# Patient Record
Sex: FEMALE | Race: WHITE | Employment: UNEMPLOYED | URBAN - METROPOLITAN AREA
[De-identification: names, ages, dates, MRNs, and addresses within clinical notes are randomized per-mention and may not be internally consistent; named-entity substitution may affect disease eponyms.]

---

## 2022-06-21 ENCOUNTER — HOSPITAL ENCOUNTER (EMERGENCY)
Age: 37
Discharge: LEFT BEFORE DISCHARGE | DRG: 074 | End: 2022-06-21
Attending: STUDENT IN AN ORGANIZED HEALTH CARE EDUCATION/TRAINING PROGRAM
Payer: MEDICARE

## 2022-06-21 VITALS
HEART RATE: 125 BPM | BODY MASS INDEX: 23.22 KG/M2 | OXYGEN SATURATION: 99 % | DIASTOLIC BLOOD PRESSURE: 43 MMHG | TEMPERATURE: 98 F | WEIGHT: 136 LBS | HEIGHT: 64 IN | SYSTOLIC BLOOD PRESSURE: 115 MMHG | RESPIRATION RATE: 20 BRPM

## 2022-06-21 DIAGNOSIS — R11.2 NAUSEA AND VOMITING, UNSPECIFIED VOMITING TYPE: Primary | ICD-10-CM

## 2022-06-21 LAB
BASOPHILS # BLD: 0.1 K/UL (ref 0–0.1)
BASOPHILS NFR BLD: 1 % (ref 0–1)
DIFFERENTIAL METHOD BLD: ABNORMAL
EOSINOPHIL # BLD: 0.2 K/UL (ref 0–0.4)
EOSINOPHIL NFR BLD: 1 % (ref 0–7)
ERYTHROCYTE [DISTWIDTH] IN BLOOD BY AUTOMATED COUNT: 15.2 % (ref 11.5–14.5)
GLUCOSE BLD STRIP.AUTO-MCNC: 157 MG/DL (ref 65–117)
HCT VFR BLD AUTO: 37.7 % (ref 35–47)
HGB BLD-MCNC: 12.4 G/DL (ref 11.5–16)
IMM GRANULOCYTES # BLD AUTO: 0.1 K/UL (ref 0–0.04)
IMM GRANULOCYTES NFR BLD AUTO: 1 % (ref 0–0.5)
LYMPHOCYTES # BLD: 2.3 K/UL (ref 0.8–3.5)
LYMPHOCYTES NFR BLD: 18 % (ref 12–49)
MCH RBC QN AUTO: 30.1 PG (ref 26–34)
MCHC RBC AUTO-ENTMCNC: 32.9 G/DL (ref 30–36.5)
MCV RBC AUTO: 91.5 FL (ref 80–99)
MONOCYTES # BLD: 0.8 K/UL (ref 0–1)
MONOCYTES NFR BLD: 6 % (ref 5–13)
NEUTS SEG # BLD: 9.4 K/UL (ref 1.8–8)
NEUTS SEG NFR BLD: 73 % (ref 32–75)
NRBC # BLD: 0 K/UL (ref 0–0.01)
NRBC BLD-RTO: 0 PER 100 WBC
PLATELET # BLD AUTO: 431 K/UL (ref 150–400)
PMV BLD AUTO: 11.1 FL (ref 8.9–12.9)
RBC # BLD AUTO: 4.12 M/UL (ref 3.8–5.2)
SERVICE CMNT-IMP: ABNORMAL
WBC # BLD AUTO: 12.8 K/UL (ref 3.6–11)

## 2022-06-21 PROCEDURE — 82962 GLUCOSE BLOOD TEST: CPT

## 2022-06-21 PROCEDURE — 99284 EMERGENCY DEPT VISIT MOD MDM: CPT

## 2022-06-21 PROCEDURE — 74011250636 HC RX REV CODE- 250/636: Performed by: STUDENT IN AN ORGANIZED HEALTH CARE EDUCATION/TRAINING PROGRAM

## 2022-06-21 PROCEDURE — 2709999900 HC NON-CHARGEABLE SUPPLY

## 2022-06-21 PROCEDURE — 96375 TX/PRO/DX INJ NEW DRUG ADDON: CPT

## 2022-06-21 PROCEDURE — 74011000250 HC RX REV CODE- 250: Performed by: STUDENT IN AN ORGANIZED HEALTH CARE EDUCATION/TRAINING PROGRAM

## 2022-06-21 PROCEDURE — 96374 THER/PROPH/DIAG INJ IV PUSH: CPT

## 2022-06-21 PROCEDURE — 96361 HYDRATE IV INFUSION ADD-ON: CPT

## 2022-06-21 PROCEDURE — 36415 COLL VENOUS BLD VENIPUNCTURE: CPT

## 2022-06-21 PROCEDURE — 85025 COMPLETE CBC W/AUTO DIFF WBC: CPT

## 2022-06-21 RX ORDER — LORAZEPAM 2 MG/ML
0.5 INJECTION INTRAMUSCULAR
Status: COMPLETED | OUTPATIENT
Start: 2022-06-21 | End: 2022-06-21

## 2022-06-21 RX ORDER — ONDANSETRON 2 MG/ML
4 INJECTION INTRAMUSCULAR; INTRAVENOUS
Status: COMPLETED | OUTPATIENT
Start: 2022-06-21 | End: 2022-06-21

## 2022-06-21 RX ORDER — MORPHINE SULFATE 2 MG/ML
4 INJECTION, SOLUTION INTRAMUSCULAR; INTRAVENOUS ONCE
Status: COMPLETED | OUTPATIENT
Start: 2022-06-21 | End: 2022-06-21

## 2022-06-21 RX ADMIN — SODIUM CHLORIDE 1000 ML: 9 INJECTION, SOLUTION INTRAVENOUS at 21:15

## 2022-06-21 RX ADMIN — ONDANSETRON 4 MG: 2 INJECTION INTRAMUSCULAR; INTRAVENOUS at 21:14

## 2022-06-21 RX ADMIN — LORAZEPAM 0.5 MG: 2 INJECTION INTRAMUSCULAR; INTRAVENOUS at 21:41

## 2022-06-21 RX ADMIN — FAMOTIDINE 20 MG: 10 INJECTION INTRAVENOUS at 21:14

## 2022-06-21 RX ADMIN — MORPHINE SULFATE 4 MG: 2 INJECTION, SOLUTION INTRAMUSCULAR; INTRAVENOUS at 21:14

## 2022-06-22 ENCOUNTER — HOSPITAL ENCOUNTER (EMERGENCY)
Age: 37
Discharge: LEFT BEFORE DISCHARGE | DRG: 074 | End: 2022-06-22
Attending: EMERGENCY MEDICINE
Payer: MEDICARE

## 2022-06-22 ENCOUNTER — APPOINTMENT (OUTPATIENT)
Dept: CT IMAGING | Age: 37
DRG: 074 | End: 2022-06-22
Attending: EMERGENCY MEDICINE
Payer: MEDICARE

## 2022-06-22 VITALS
BODY MASS INDEX: 23.22 KG/M2 | WEIGHT: 136 LBS | RESPIRATION RATE: 16 BRPM | TEMPERATURE: 97.9 F | OXYGEN SATURATION: 100 % | HEIGHT: 64 IN | HEART RATE: 114 BPM | DIASTOLIC BLOOD PRESSURE: 85 MMHG | SYSTOLIC BLOOD PRESSURE: 145 MMHG

## 2022-06-22 DIAGNOSIS — R11.2 NAUSEA AND VOMITING IN ADULT: ICD-10-CM

## 2022-06-22 DIAGNOSIS — R10.84 ABDOMINAL PAIN, GENERALIZED: Primary | ICD-10-CM

## 2022-06-22 LAB
APPEARANCE UR: CLEAR
BACTERIA URNS QL MICRO: ABNORMAL /HPF
BILIRUB UR QL: NEGATIVE
COLOR UR: ABNORMAL
EPITH CASTS URNS QL MICRO: ABNORMAL /LPF
GLUCOSE BLD STRIP.AUTO-MCNC: 167 MG/DL (ref 65–117)
GLUCOSE UR STRIP.AUTO-MCNC: NEGATIVE MG/DL
HGB UR QL STRIP: ABNORMAL
HYALINE CASTS URNS QL MICRO: ABNORMAL /LPF (ref 0–5)
KETONES UR QL STRIP.AUTO: 40 MG/DL
LEUKOCYTE ESTERASE UR QL STRIP.AUTO: ABNORMAL
NITRITE UR QL STRIP.AUTO: NEGATIVE
PH UR STRIP: 5.5 [PH] (ref 5–8)
PROT UR STRIP-MCNC: 100 MG/DL
RBC #/AREA URNS HPF: ABNORMAL /HPF (ref 0–5)
SERVICE CMNT-IMP: ABNORMAL
SP GR UR REFRACTOMETRY: 1.03
UA: UC IF INDICATED,UAUC: ABNORMAL
UROBILINOGEN UR QL STRIP.AUTO: 0.2 EU/DL (ref 0.2–1)
WBC URNS QL MICRO: ABNORMAL /HPF (ref 0–4)

## 2022-06-22 PROCEDURE — 81001 URINALYSIS AUTO W/SCOPE: CPT

## 2022-06-22 PROCEDURE — 87086 URINE CULTURE/COLONY COUNT: CPT

## 2022-06-22 PROCEDURE — 96372 THER/PROPH/DIAG INJ SC/IM: CPT

## 2022-06-22 PROCEDURE — 74011250637 HC RX REV CODE- 250/637: Performed by: EMERGENCY MEDICINE

## 2022-06-22 PROCEDURE — 82962 GLUCOSE BLOOD TEST: CPT

## 2022-06-22 PROCEDURE — 74011250636 HC RX REV CODE- 250/636: Performed by: EMERGENCY MEDICINE

## 2022-06-22 PROCEDURE — 99284 EMERGENCY DEPT VISIT MOD MDM: CPT

## 2022-06-22 RX ORDER — HYDROMORPHONE HYDROCHLORIDE 1 MG/ML
0.5 INJECTION, SOLUTION INTRAMUSCULAR; INTRAVENOUS; SUBCUTANEOUS
Status: DISCONTINUED | OUTPATIENT
Start: 2022-06-22 | End: 2022-06-22

## 2022-06-22 RX ORDER — ONDANSETRON 4 MG/1
4 TABLET, ORALLY DISINTEGRATING ORAL
Status: COMPLETED | OUTPATIENT
Start: 2022-06-22 | End: 2022-06-22

## 2022-06-22 RX ORDER — ONDANSETRON 2 MG/ML
4 INJECTION INTRAMUSCULAR; INTRAVENOUS
Status: DISCONTINUED | OUTPATIENT
Start: 2022-06-22 | End: 2022-06-22

## 2022-06-22 RX ORDER — FENTANYL CITRATE 50 UG/ML
25 INJECTION, SOLUTION INTRAMUSCULAR; INTRAVENOUS
Status: DISCONTINUED | OUTPATIENT
Start: 2022-06-22 | End: 2022-06-22

## 2022-06-22 RX ORDER — HYDROMORPHONE HYDROCHLORIDE 1 MG/ML
0.5 INJECTION, SOLUTION INTRAMUSCULAR; INTRAVENOUS; SUBCUTANEOUS
Status: COMPLETED | OUTPATIENT
Start: 2022-06-22 | End: 2022-06-22

## 2022-06-22 RX ADMIN — ONDANSETRON 4 MG: 4 TABLET, ORALLY DISINTEGRATING ORAL at 10:50

## 2022-06-22 RX ADMIN — HYDROMORPHONE HYDROCHLORIDE 0.5 MG: 1 INJECTION, SOLUTION INTRAMUSCULAR; INTRAVENOUS; SUBCUTANEOUS at 10:53

## 2022-06-22 NOTE — ED NOTES
Pt sister called and stated that she is trying to get patient into [facility]. RN asked patient about claims and pt stated that she does not want any information provided to her sister, Zonia Seymour, and she asked that we ignore the [facility] initiation and focus on today's visit.

## 2022-06-22 NOTE — ED PROVIDER NOTES
EMERGENCY DEPARTMENT HISTORY AND PHYSICAL EXAM      Date: 2022  Patient Name: Nancy Godinez    History of Presenting Illness     Chief Complaint   Patient presents with    Abdominal Pain     Patient arrives with complaint of abdominal pain for the past 5 days. Patient reports vomiting and has history of gastroparesis. HPI: Nancy Godinez, 42 y.o. female presents to the ED with cc of nausea vomiting and abdominal pain. Patient is deaf, her sister is at bedside, both patient and sister request her sister translate for her. She declines video interpretation. Patient states that for the past 5 days she has had diffuse abdominal pain, has a history of gastroparesis and states this feels like her gastroparesis. Reports countless episodes of nonbloody emesis. The pain is constant without exacerbating or alleviating factors. No diarrhea, no fevers, no dysuria or hematuria. States she takes Reglan and morphine at home. She initially refuses all blood work, states that she just wants a shot of pain medications. There are no other complaints, changes, or physical findings at this time. PCP: None    No current facility-administered medications on file prior to encounter. Current Outpatient Medications on File Prior to Encounter   Medication Sig Dispense Refill    BUSPIRONE HCL (BUSPAR PO) Take  by mouth. Past History     Past Medical History:  Past Medical History:   Diagnosis Date    Anxiety     Deaf     Fibromyalgia     Musculoskeletal disorder     back problems    Pleurisy        Past Surgical History:   x3    Family History:  No family history on file. Social History:  Social History     Tobacco Use    Smoking status: Current Every Day Smoker    Smokeless tobacco: Not on file   Substance Use Topics    Alcohol use: No    Drug use: Yes     Types: Marijuana       Allergies:   Allergies   Allergen Reactions    Codeine Nausea and Vomiting    Ibuprofen Nausea and Vomiting         Review of Systems   no fever  No ear pain  No eye pain  no shortness of breath  no chest pain  Reports abdominal pain  no dysuria  no leg pain  No rash  No lymphadenopathy  No weight loss    Physical Exam   Physical Exam  Constitutional:       Appearance: She is not toxic-appearing. Comments: Intermittently agitated   HENT:      Head: Normocephalic. Eyes:      Extraocular Movements: Extraocular movements intact. Cardiovascular:      Rate and Rhythm: Normal rate and regular rhythm. Pulmonary:      Effort: Pulmonary effort is normal.      Breath sounds: Normal breath sounds. Abdominal:      Palpations: Abdomen is soft. Tenderness: There is abdominal tenderness. Comments: Diffuse slight poorly localizable abdominal tenderness without guarding or rebound tenderness   Musculoskeletal:         General: No tenderness or deformity. Cervical back: Neck supple. Skin:     General: Skin is warm and dry. Neurological:      General: No focal deficit present. Mental Status: She is alert. Diagnostic Study Results     Labs -     Recent Results (from the past 24 hour(s))   GLUCOSE, POC    Collection Time: 06/21/22  8:25 PM   Result Value Ref Range    Glucose (POC) 157 (H) 65 - 117 mg/dL    Performed by Vicenta Mcdowell RN    CBC WITH AUTOMATED DIFF    Collection Time: 06/21/22  9:05 PM   Result Value Ref Range    WBC 12.8 (H) 3.6 - 11.0 K/uL    RBC 4.12 3.80 - 5.20 M/uL    HGB 12.4 11.5 - 16.0 g/dL    HCT 37.7 35.0 - 47.0 %    MCV 91.5 80.0 - 99.0 FL    MCH 30.1 26.0 - 34.0 PG    MCHC 32.9 30.0 - 36.5 g/dL    RDW 15.2 (H) 11.5 - 14.5 %    PLATELET 136 (H) 687 - 400 K/uL    MPV 11.1 8.9 - 12.9 FL    NRBC 0.0 0  WBC    ABSOLUTE NRBC 0.00 0.00 - 0.01 K/uL    NEUTROPHILS 73 32 - 75 %    LYMPHOCYTES 18 12 - 49 %    MONOCYTES 6 5 - 13 %    EOSINOPHILS 1 0 - 7 %    BASOPHILS 1 0 - 1 %    IMMATURE GRANULOCYTES 1 (H) 0.0 - 0.5 %    ABS.  NEUTROPHILS 9.4 (H) 1.8 - 8.0 K/UL ABS. LYMPHOCYTES 2.3 0.8 - 3.5 K/UL    ABS. MONOCYTES 0.8 0.0 - 1.0 K/UL    ABS. EOSINOPHILS 0.2 0.0 - 0.4 K/UL    ABS. BASOPHILS 0.1 0.0 - 0.1 K/UL    ABS. IMM. GRANS. 0.1 (H) 0.00 - 0.04 K/UL    DF AUTOMATED         Radiologic Studies -   No orders to display     CT Results  (Last 48 hours)    None        CXR Results  (Last 48 hours)    None            Medical Decision Making   I am the first provider for this patient. I reviewed the vital signs, available nursing notes, past medical history, past surgical history, family history and social history. Vital Signs-Reviewed the patient's vital signs. Patient Vitals for the past 24 hrs:   Temp Pulse Resp BP SpO2   06/21/22 2023 98 °F (36.7 °C) (!) 125 20 (!) 115/43 99 %         Provider Notes (Medical Decision Making):   66-year-old female presenting with abdominal pain nausea and vomiting. Has known history of type 1 diabetes, states symptoms feel similar to priorexacerbations of gastroparesis. Differential includes gastroparesis, gastritis or GERD, viral syndrome, electrolyte or metabolic abnormalities, dehydration. She will be given IV fluidsAnd antiemetics. She is afebrile nontoxic-appearing, no peritoneal signs on abdominal exam, no right lower quadrant or left lower quadrant tenderness, unlikely appendicitis, diverticulitis, or any other acute intra-abdominal infection or obstruction. No abdominal distention. ED Course:     Initial assessment performed. The patients presenting problems have been discussed, and they are in agreement with the care plan formulated and outlined with them. I have encouraged them to ask questions as they arise throughout their visit. On reevaluation, patient states that she would like to leave. She is frustrated, states that she is a hard stick and that she does not want to get any needle sticks. She states that the hospital makes her anxious and she wants to leave right now.   Sister continues to be at bedside to help translate. I have offered video interpretation, and patient again declines, she states that she just wants to leave. I explained to her that there is significant risk of her doing so, including dehydration, DKA, renal dysfunction, even death. She states that she understands these risk and she would still like to leave. She is exhibiting logical judgment, she is decisional.  She will be discharged [address] Instructed that she can come back to the emergency department anytime, otherwise needs to follow-up with her primary care doctor tomorrow. Critical Care Time:         Disposition:  Discharge home with sister AGAINST MEDICAL ADVICE    PLAN:  1. Discharge Medication List as of 6/21/2022 10:13 PM        2.    Follow-up Information     Follow up With Specialties Details Why Contact Info    Your primary care doctor  Schedule an appointment as soon as possible for a visit in 1 day      [facility] EMERGENCY DEPT Emergency Medicine  As needed, If symptoms worsen [address] [place] [address]  [phone]        Return to ED if worse     Diagnosis     Clinical Impression: Acute nausea and vomiting with history of gastroparesis

## 2022-06-22 NOTE — ED NOTES
With use of Benjamin carpio #[id number], pt is refusing IV stating it does not work. RN asked how she should obtain blood and patient refusing to respond to RN.  RN will inform MD.

## 2022-06-22 NOTE — ED NOTES
RN informed MD of patient's vasculature being difficult, even with US. MD aware patient is requesting a shot of pain medicine and oral nausea medicine.

## 2022-06-22 NOTE — ED PROVIDER NOTES
EMERGENCY DEPARTMENT HISTORY AND PHYSICAL EXAM      Date: 6/22/2022  Patient Name: Jose Alejandro Heaton    History of Presenting Illness     Chief Complaint   Patient presents with    Abdominal Pain     via EMS. pt is hearing impaired. an  is used. pt complains of gastritis with abdominal pain. She states she cannot eat or drink x 5 days. multiple episodes of vomiting today. no diarrhea. no fever. She was seen here last night for the same.  Vomiting       History Provided By: Patient and     HPI: Jose Alejandro Heaton, 42 y.o. female presents to the ED with cc of abdominal pain and vomiting. This is the patient's sixth ER visit since June 17 for the same complaint. She was seen in our ER last night, and signed out [address] She has had constant abdominal pain which is primarily on the left abdomen. She denies back pain or chest pain. She is vomiting multiple times a day. She says she can't eat or drink. She denies fever or chills. She denies cough, dysuria or change in bowel habits. Her pain is currently a 10 out of 10 in severity. She has not had any imaging of her abdomen on any of her prior ER visits. Yesterday, she only had a POC glucose and CBC result, because she was a difficult stick. There are no other complaints, changes, or physical findings at this time.     PCP: None    Current Facility-Administered Medications on File Prior to Encounter   Medication Dose Route Frequency Provider Last Rate Last Admin    [COMPLETED] morphine injection 4 mg  4 mg IntraVENous ONCE Toshia Jones MD   4 mg at 06/21/22 2114    [COMPLETED] ondansetron (ZOFRAN) injection 4 mg  4 mg IntraVENous NOW Toshia Jones MD   4 mg at 06/21/22 2114    [COMPLETED] famotidine (PF) (PEPCID) 20 mg in 0.9% sodium chloride 10 mL injection  20 mg IntraVENous NOW Toshia Jones MD   20 mg at 06/21/22 2114    [COMPLETED] sodium chloride 0.9 % bolus infusion 1,000 mL 1,000 mL IntraVENous ONCE Sweta Jones MD   IV Completed at 06/21/22 2211    [COMPLETED] LORazepam (ATIVAN) injection 0.5 mg  0.5 mg IntraVENous NOW Sweta Jones MD   0.5 mg at 06/21/22 2141     Current Outpatient Medications on File Prior to Encounter   Medication Sig Dispense Refill    BUSPIRONE HCL (BUSPAR PO) Take  by mouth. Past History     Past Medical History:  Past Medical History:   Diagnosis Date    Anxiety     Deaf     Fibromyalgia     Musculoskeletal disorder     back problems    Pleurisy        Past Surgical History:  No past surgical history on file. Family History:  No family history on file. Social History:  Social History     Tobacco Use    Smoking status: Current Every Day Smoker    Smokeless tobacco: Not on file   Substance Use Topics    Alcohol use: No    Drug use: Yes     Types: Marijuana       Allergies: Allergies   Allergen Reactions    Codeine Nausea and Vomiting    Ibuprofen Nausea and Vomiting         Review of Systems   Review of Systems   Constitutional: Negative for fever. HENT: Negative for congestion. Eyes: Negative. Respiratory: Negative for shortness of breath. Cardiovascular: Negative for chest pain. Gastrointestinal: Positive for abdominal pain, nausea and vomiting. Endocrine: Negative for heat intolerance. Genitourinary: Negative. Musculoskeletal: Negative for back pain. Skin: Negative for rash. Allergic/Immunologic: Negative for immunocompromised state. Neurological: Positive for light-headedness. Hematological: Does not bruise/bleed easily. Psychiatric/Behavioral: Negative. All other systems reviewed and are negative. Physical Exam   Physical Exam  Vitals and nursing note reviewed. Constitutional:       General: She is not in acute distress. Appearance: She is well-developed. HENT:      Head: Normocephalic. Cardiovascular:      Rate and Rhythm: Regular rhythm. Tachycardia present. Heart sounds: Normal heart sounds. Pulmonary:      Effort: Pulmonary effort is normal.      Breath sounds: Normal breath sounds. Abdominal:      General: Bowel sounds are normal.      Palpations: Abdomen is soft. Tenderness: There is abdominal tenderness in the right upper quadrant, right lower quadrant, left upper quadrant and left lower quadrant. Musculoskeletal:         General: Normal range of motion. Cervical back: Normal range of motion and neck supple. Skin:     General: Skin is warm and dry. Neurological:      General: No focal deficit present. Mental Status: She is alert and oriented to person, place, and time. Psychiatric:         Mood and Affect: Mood normal.         Behavior: Behavior normal.         Diagnostic Study Results     Labs -     Recent Results (from the past 12 hour(s))   GLUCOSE, POC    Collection Time: 06/22/22  9:02 AM   Result Value Ref Range    Glucose (POC) 167 (H) 65 - 117 mg/dL    Performed by Romario WILLETT    URINALYSIS W/ REFLEX CULTURE    Collection Time: 06/22/22 10:29 AM    Specimen: Urine   Result Value Ref Range    Color YELLOW/STRAW      Appearance CLEAR CLEAR      Specific gravity 1.027      pH (UA) 5.5 5.0 - 8.0      Protein 100 (A) NEG mg/dL    Glucose Negative NEG mg/dL    Ketone 40 (A) NEG mg/dL    Bilirubin Negative NEG      Blood MODERATE (A) NEG      Urobilinogen 0.2 0.2 - 1.0 EU/dL    Nitrites Negative NEG      Leukocyte Esterase TRACE (A) NEG      WBC PENDING /hpf    RBC PENDING /hpf    Epithelial cells PENDING /lpf    Bacteria PENDING /hpf    UA:UC IF INDICATED PENDING        Radiologic Studies -   CT ABD PELV W CONT    (Results Pending)     CT Results  (Last 48 hours)    None        CXR Results  (Last 48 hours)    None          Medical Decision Making   I am the first provider for this patient.     I reviewed the vital signs, available nursing notes, past medical history, past surgical history, family history and social history. Vital Signs-Reviewed the patient's vital signs. Patient Vitals for the past 12 hrs:   Temp Pulse Resp BP SpO2   06/22/22 0902 97.9 °F (36.6 °C) (!) 114 16 (!) 145/85 100 %           Records Reviewed: Nursing Notes, Old Medical Records, Previous Radiology Studies and Previous Laboratory Studies    Provider Notes (Medical Decision Making):   Gastroparesis, dehydration, electrolyte abnormality, obstruction, pancreatitis, diverticulitis, UTI    ED Course:   Initial assessment performed. The patients presenting problems have been discussed, and they are in agreement with the care plan formulated and outlined with them. I have encouraged them to ask questions as they arise throughout their visit. Progress note: The patient has signed out [address] The risk and benefits were explained to her. Critical Care Time:   0    Disposition:  AMA    DISCHARGE PLAN:  1. Discharge Medication List as of 6/22/2022 11:06 AM        2. Follow-up Information    None       3. Return to ED if worse     Diagnosis     Clinical Impression:   1. Abdominal pain, generalized    2. Nausea and vomiting in adult        Attestations:    Tra Garrett MD    Please note that this dictation was completed with [employer], the computer voice recognition software. Quite often unanticipated grammatical, syntax, homophones, and other interpretive errors are inadvertently transcribed by the computer software. Please disregard these errors. Please excuse any errors that have escaped final proofreading. Thank you.

## 2022-06-23 ENCOUNTER — HOSPITAL ENCOUNTER (INPATIENT)
Age: 37
LOS: 2 days | Discharge: HOME | DRG: 074 | End: 2022-06-25
Attending: EMERGENCY MEDICINE | Admitting: HOSPITALIST
Payer: MEDICARE

## 2022-06-23 ENCOUNTER — APPOINTMENT (OUTPATIENT)
Dept: CT IMAGING | Age: 37
DRG: 074 | End: 2022-06-23
Attending: EMERGENCY MEDICINE
Payer: MEDICARE

## 2022-06-23 DIAGNOSIS — N17.9 AKI (ACUTE KIDNEY INJURY) (HCC): ICD-10-CM

## 2022-06-23 DIAGNOSIS — R10.9 INTRACTABLE ABDOMINAL PAIN: Primary | ICD-10-CM

## 2022-06-23 DIAGNOSIS — E87.6 HYPOKALEMIA: ICD-10-CM

## 2022-06-23 DIAGNOSIS — M79.604 RIGHT LEG PAIN: ICD-10-CM

## 2022-06-23 PROBLEM — N39.0 UTI (URINARY TRACT INFECTION): Status: ACTIVE | Noted: 2022-06-23

## 2022-06-23 PROBLEM — F19.10 DRUG ABUSE (HCC): Status: ACTIVE | Noted: 2022-06-23

## 2022-06-23 PROBLEM — K31.84 DIABETIC GASTROPARESIS (HCC): Status: ACTIVE | Noted: 2022-06-23

## 2022-06-23 PROBLEM — Z86.39 H/O INSULIN DEPENDENT DIABETES MELLITUS: Status: ACTIVE | Noted: 2022-06-23

## 2022-06-23 PROBLEM — D72.829 LEUKOCYTOSIS: Status: ACTIVE | Noted: 2022-06-23

## 2022-06-23 PROBLEM — R11.2 INTRACTABLE NAUSEA AND VOMITING: Status: ACTIVE | Noted: 2022-06-23

## 2022-06-23 PROBLEM — E11.43 DIABETIC GASTROPARESIS (HCC): Status: ACTIVE | Noted: 2022-06-23

## 2022-06-23 LAB
ALBUMIN SERPL-MCNC: 4.2 G/DL (ref 3.5–5)
ALBUMIN/GLOB SERPL: 1.1 {RATIO} (ref 1.1–2.2)
ALP SERPL-CCNC: 95 U/L (ref 45–117)
ALT SERPL-CCNC: 116 U/L (ref 12–78)
AMPHET UR QL SCN: NEGATIVE
ANION GAP SERPL CALC-SCNC: 12 MMOL/L (ref 5–15)
APAP SERPL-MCNC: 11 UG/ML (ref 10–30)
APPEARANCE UR: CLEAR
AST SERPL-CCNC: 95 U/L (ref 15–37)
BACTERIA SPEC CULT: NORMAL
BACTERIA URNS QL MICRO: ABNORMAL /HPF
BARBITURATES UR QL SCN: POSITIVE
BASOPHILS # BLD: 0.1 K/UL (ref 0–0.1)
BASOPHILS NFR BLD: 0 % (ref 0–1)
BENZODIAZ UR QL: NEGATIVE
BILIRUB SERPL-MCNC: 0.3 MG/DL (ref 0.2–1)
BILIRUB UR QL: NEGATIVE
BUN SERPL-MCNC: 30 MG/DL (ref 6–20)
BUN/CREAT SERPL: 19 (ref 12–20)
CALCIUM SERPL-MCNC: 9 MG/DL (ref 8.5–10.1)
CANNABINOIDS UR QL SCN: POSITIVE
CC UR VC: NORMAL
CHLORIDE SERPL-SCNC: 104 MMOL/L (ref 97–108)
CO2 SERPL-SCNC: 21 MMOL/L (ref 21–32)
COCAINE UR QL SCN: NEGATIVE
COLOR UR: ABNORMAL
COMMENT, HOLDF: NORMAL
CREAT SERPL-MCNC: 1.6 MG/DL (ref 0.55–1.02)
DIFFERENTIAL METHOD BLD: ABNORMAL
DRUG SCRN COMMENT,DRGCM: ABNORMAL
EOSINOPHIL # BLD: 0.2 K/UL (ref 0–0.4)
EOSINOPHIL NFR BLD: 2 % (ref 0–7)
EPITH CASTS URNS QL MICRO: ABNORMAL /LPF
ERYTHROCYTE [DISTWIDTH] IN BLOOD BY AUTOMATED COUNT: 15.1 % (ref 11.5–14.5)
GLOBULIN SER CALC-MCNC: 3.8 G/DL (ref 2–4)
GLUCOSE BLD STRIP.AUTO-MCNC: 124 MG/DL (ref 65–117)
GLUCOSE SERPL-MCNC: 143 MG/DL (ref 65–100)
GLUCOSE UR STRIP.AUTO-MCNC: NEGATIVE MG/DL
HCG SERPL QL: NEGATIVE
HCT VFR BLD AUTO: 39.9 % (ref 35–47)
HGB BLD-MCNC: 13.3 G/DL (ref 11.5–16)
HGB UR QL STRIP: NEGATIVE
HYALINE CASTS URNS QL MICRO: ABNORMAL /LPF (ref 0–5)
IMM GRANULOCYTES # BLD AUTO: 0.1 K/UL (ref 0–0.04)
IMM GRANULOCYTES NFR BLD AUTO: 1 % (ref 0–0.5)
KETONES UR QL STRIP.AUTO: 40 MG/DL
LEUKOCYTE ESTERASE UR QL STRIP.AUTO: ABNORMAL
LIPASE SERPL-CCNC: 47 U/L (ref 73–393)
LYMPHOCYTES # BLD: 2 K/UL (ref 0.8–3.5)
LYMPHOCYTES NFR BLD: 15 % (ref 12–49)
MAGNESIUM SERPL-MCNC: 2.8 MG/DL (ref 1.6–2.4)
MCH RBC QN AUTO: 30.5 PG (ref 26–34)
MCHC RBC AUTO-ENTMCNC: 33.3 G/DL (ref 30–36.5)
MCV RBC AUTO: 91.5 FL (ref 80–99)
METHADONE UR QL: NEGATIVE
MONOCYTES # BLD: 0.8 K/UL (ref 0–1)
MONOCYTES NFR BLD: 6 % (ref 5–13)
NEUTS SEG # BLD: 10.3 K/UL (ref 1.8–8)
NEUTS SEG NFR BLD: 76 % (ref 32–75)
NITRITE UR QL STRIP.AUTO: NEGATIVE
NRBC # BLD: 0 K/UL (ref 0–0.01)
NRBC BLD-RTO: 0 PER 100 WBC
OPIATES UR QL: POSITIVE
OTHER,OTHU: ABNORMAL
PCP UR QL: NEGATIVE
PH UR STRIP: 6 [PH] (ref 5–8)
PLATELET # BLD AUTO: 467 K/UL (ref 150–400)
PMV BLD AUTO: 10.3 FL (ref 8.9–12.9)
POTASSIUM SERPL-SCNC: 2.6 MMOL/L (ref 3.5–5.1)
PROT SERPL-MCNC: 8 G/DL (ref 6.4–8.2)
PROT UR STRIP-MCNC: 100 MG/DL
RBC # BLD AUTO: 4.36 M/UL (ref 3.8–5.2)
RBC #/AREA URNS HPF: ABNORMAL /HPF (ref 0–5)
SALICYLATES SERPL-MCNC: 18.9 MG/DL (ref 2.8–20)
SAMPLES BEING HELD,HOLD: NORMAL
SERVICE CMNT-IMP: ABNORMAL
SERVICE CMNT-IMP: NORMAL
SODIUM SERPL-SCNC: 137 MMOL/L (ref 136–145)
SP GR UR REFRACTOMETRY: 1.03
UR CULT HOLD, URHOLD: NORMAL
UROBILINOGEN UR QL STRIP.AUTO: 1 EU/DL (ref 0.2–1)
WBC # BLD AUTO: 13.4 K/UL (ref 3.6–11)
WBC URNS QL MICRO: ABNORMAL /HPF (ref 0–4)
YEAST URNS QL MICRO: PRESENT

## 2022-06-23 PROCEDURE — 83036 HEMOGLOBIN GLYCOSYLATED A1C: CPT

## 2022-06-23 PROCEDURE — 80053 COMPREHEN METABOLIC PANEL: CPT

## 2022-06-23 PROCEDURE — 80179 DRUG ASSAY SALICYLATE: CPT

## 2022-06-23 PROCEDURE — 82962 GLUCOSE BLOOD TEST: CPT

## 2022-06-23 PROCEDURE — 80143 DRUG ASSAY ACETAMINOPHEN: CPT

## 2022-06-23 PROCEDURE — 74011250636 HC RX REV CODE- 250/636: Performed by: EMERGENCY MEDICINE

## 2022-06-23 PROCEDURE — 96374 THER/PROPH/DIAG INJ IV PUSH: CPT

## 2022-06-23 PROCEDURE — 96361 HYDRATE IV INFUSION ADD-ON: CPT

## 2022-06-23 PROCEDURE — 85025 COMPLETE CBC W/AUTO DIFF WBC: CPT

## 2022-06-23 PROCEDURE — 84703 CHORIONIC GONADOTROPIN ASSAY: CPT

## 2022-06-23 PROCEDURE — 74011250636 HC RX REV CODE- 250/636: Performed by: HOSPITALIST

## 2022-06-23 PROCEDURE — 74011250637 HC RX REV CODE- 250/637: Performed by: NURSE PRACTITIONER

## 2022-06-23 PROCEDURE — 83690 ASSAY OF LIPASE: CPT

## 2022-06-23 PROCEDURE — 74011250637 HC RX REV CODE- 250/637: Performed by: EMERGENCY MEDICINE

## 2022-06-23 PROCEDURE — 65270000029 HC RM PRIVATE

## 2022-06-23 PROCEDURE — 96375 TX/PRO/DX INJ NEW DRUG ADDON: CPT

## 2022-06-23 PROCEDURE — 36415 COLL VENOUS BLD VENIPUNCTURE: CPT

## 2022-06-23 PROCEDURE — 83735 ASSAY OF MAGNESIUM: CPT

## 2022-06-23 PROCEDURE — 96372 THER/PROPH/DIAG INJ SC/IM: CPT

## 2022-06-23 PROCEDURE — 80307 DRUG TEST PRSMV CHEM ANLYZR: CPT

## 2022-06-23 PROCEDURE — 99285 EMERGENCY DEPT VISIT HI MDM: CPT

## 2022-06-23 RX ORDER — HALOPERIDOL 5 MG/ML
2.5 INJECTION INTRAMUSCULAR
Status: DISCONTINUED | OUTPATIENT
Start: 2022-06-23 | End: 2022-06-23

## 2022-06-23 RX ORDER — ONDANSETRON 4 MG/1
4 TABLET, ORALLY DISINTEGRATING ORAL
Status: DISCONTINUED | OUTPATIENT
Start: 2022-06-23 | End: 2022-06-23

## 2022-06-23 RX ORDER — INSULIN ASPART 100 [IU]/ML
INJECTION, SOLUTION INTRAVENOUS; SUBCUTANEOUS
COMMUNITY

## 2022-06-23 RX ORDER — SODIUM CHLORIDE 0.9 % (FLUSH) 0.9 %
5-40 SYRINGE (ML) INJECTION EVERY 8 HOURS
Status: DISCONTINUED | OUTPATIENT
Start: 2022-06-23 | End: 2022-06-25 | Stop reason: HOSPADM

## 2022-06-23 RX ORDER — DICYCLOMINE HYDROCHLORIDE 20 MG/1
TABLET ORAL
COMMUNITY
End: 2022-07-09

## 2022-06-23 RX ORDER — ACETAMINOPHEN 650 MG/1
650 SUPPOSITORY RECTAL
Status: DISCONTINUED | OUTPATIENT
Start: 2022-06-23 | End: 2022-06-25 | Stop reason: HOSPADM

## 2022-06-23 RX ORDER — ONDANSETRON 2 MG/ML
4 INJECTION INTRAMUSCULAR; INTRAVENOUS
Status: COMPLETED | OUTPATIENT
Start: 2022-06-23 | End: 2022-06-23

## 2022-06-23 RX ORDER — ACETAMINOPHEN 325 MG/1
650 TABLET ORAL
Status: DISCONTINUED | OUTPATIENT
Start: 2022-06-23 | End: 2022-06-25 | Stop reason: HOSPADM

## 2022-06-23 RX ORDER — MAGNESIUM SULFATE 100 %
4 CRYSTALS MISCELLANEOUS AS NEEDED
Status: DISCONTINUED | OUTPATIENT
Start: 2022-06-23 | End: 2022-06-25 | Stop reason: HOSPADM

## 2022-06-23 RX ORDER — ENOXAPARIN SODIUM 100 MG/ML
40 INJECTION SUBCUTANEOUS DAILY
Status: DISCONTINUED | OUTPATIENT
Start: 2022-06-24 | End: 2022-06-25 | Stop reason: HOSPADM

## 2022-06-23 RX ORDER — IBUPROFEN 200 MG
1 TABLET ORAL EVERY 24 HOURS
Status: DISCONTINUED | OUTPATIENT
Start: 2022-06-23 | End: 2022-06-25 | Stop reason: HOSPADM

## 2022-06-23 RX ORDER — MORPHINE SULFATE 2 MG/ML
4 INJECTION, SOLUTION INTRAMUSCULAR; INTRAVENOUS
Status: DISCONTINUED | OUTPATIENT
Start: 2022-06-23 | End: 2022-06-23

## 2022-06-23 RX ORDER — HALOPERIDOL 5 MG/ML
3 INJECTION INTRAMUSCULAR
Status: COMPLETED | OUTPATIENT
Start: 2022-06-23 | End: 2022-06-23

## 2022-06-23 RX ORDER — POTASSIUM CHLORIDE AND SODIUM CHLORIDE 900; 300 MG/100ML; MG/100ML
INJECTION, SOLUTION INTRAVENOUS CONTINUOUS
Status: DISCONTINUED | OUTPATIENT
Start: 2022-06-23 | End: 2022-06-25 | Stop reason: HOSPADM

## 2022-06-23 RX ORDER — MORPHINE SULFATE 2 MG/ML
1 INJECTION, SOLUTION INTRAMUSCULAR; INTRAVENOUS
Status: DISCONTINUED | OUTPATIENT
Start: 2022-06-23 | End: 2022-06-25 | Stop reason: HOSPADM

## 2022-06-23 RX ORDER — LORAZEPAM 2 MG/ML
1 INJECTION INTRAMUSCULAR
Status: DISCONTINUED | OUTPATIENT
Start: 2022-06-23 | End: 2022-06-23

## 2022-06-23 RX ORDER — ONDANSETRON 2 MG/ML
4 INJECTION INTRAMUSCULAR; INTRAVENOUS
Status: DISCONTINUED | OUTPATIENT
Start: 2022-06-23 | End: 2022-06-25 | Stop reason: HOSPADM

## 2022-06-23 RX ORDER — POTASSIUM CHLORIDE 750 MG/1
40 TABLET, FILM COATED, EXTENDED RELEASE ORAL
Status: COMPLETED | OUTPATIENT
Start: 2022-06-23 | End: 2022-06-23

## 2022-06-23 RX ORDER — INSULIN LISPRO 100 [IU]/ML
INJECTION, SOLUTION INTRAVENOUS; SUBCUTANEOUS
Status: DISCONTINUED | OUTPATIENT
Start: 2022-06-23 | End: 2022-06-25 | Stop reason: HOSPADM

## 2022-06-23 RX ORDER — MORPHINE SULFATE 2 MG/ML
4 INJECTION, SOLUTION INTRAMUSCULAR; INTRAVENOUS
Status: COMPLETED | OUTPATIENT
Start: 2022-06-23 | End: 2022-06-23

## 2022-06-23 RX ORDER — LORAZEPAM 1 MG/1
1 TABLET ORAL ONCE
Status: COMPLETED | OUTPATIENT
Start: 2022-06-23 | End: 2022-06-23

## 2022-06-23 RX ORDER — ONDANSETRON 4 MG/1
4 TABLET, ORALLY DISINTEGRATING ORAL
Status: DISCONTINUED | OUTPATIENT
Start: 2022-06-23 | End: 2022-06-25 | Stop reason: HOSPADM

## 2022-06-23 RX ORDER — POLYETHYLENE GLYCOL 3350 17 G/17G
17 POWDER, FOR SOLUTION ORAL DAILY PRN
Status: DISCONTINUED | OUTPATIENT
Start: 2022-06-23 | End: 2022-06-25 | Stop reason: HOSPADM

## 2022-06-23 RX ORDER — SODIUM CHLORIDE 0.9 % (FLUSH) 0.9 %
5-40 SYRINGE (ML) INJECTION AS NEEDED
Status: DISCONTINUED | OUTPATIENT
Start: 2022-06-23 | End: 2022-06-25 | Stop reason: HOSPADM

## 2022-06-23 RX ORDER — DEXTROSE MONOHYDRATE 100 MG/ML
0-250 INJECTION, SOLUTION INTRAVENOUS AS NEEDED
Status: DISCONTINUED | OUTPATIENT
Start: 2022-06-23 | End: 2022-06-25 | Stop reason: HOSPADM

## 2022-06-23 RX ORDER — LORAZEPAM 2 MG/ML
1 INJECTION INTRAMUSCULAR
Status: COMPLETED | OUTPATIENT
Start: 2022-06-23 | End: 2022-06-23

## 2022-06-23 RX ORDER — LORAZEPAM 2 MG/ML
0.5 INJECTION INTRAMUSCULAR
Status: DISCONTINUED | OUTPATIENT
Start: 2022-06-23 | End: 2022-06-23

## 2022-06-23 RX ORDER — FLUCONAZOLE 2 MG/ML
200 INJECTION, SOLUTION INTRAVENOUS EVERY 24 HOURS
Status: DISCONTINUED | OUTPATIENT
Start: 2022-06-23 | End: 2022-06-25 | Stop reason: HOSPADM

## 2022-06-23 RX ORDER — HYDROMORPHONE HYDROCHLORIDE 1 MG/ML
0.5 INJECTION, SOLUTION INTRAMUSCULAR; INTRAVENOUS; SUBCUTANEOUS
Status: DISCONTINUED | OUTPATIENT
Start: 2022-06-23 | End: 2022-06-23

## 2022-06-23 RX ADMIN — LORAZEPAM 1 MG: 2 INJECTION INTRAMUSCULAR; INTRAVENOUS at 19:24

## 2022-06-23 RX ADMIN — MORPHINE SULFATE 1 MG: 2 INJECTION, SOLUTION INTRAMUSCULAR; INTRAVENOUS at 20:50

## 2022-06-23 RX ADMIN — MORPHINE SULFATE 4 MG: 2 INJECTION, SOLUTION INTRAMUSCULAR; INTRAVENOUS at 17:04

## 2022-06-23 RX ADMIN — MORPHINE SULFATE 1 MG: 2 INJECTION, SOLUTION INTRAMUSCULAR; INTRAVENOUS at 23:48

## 2022-06-23 RX ADMIN — POTASSIUM CHLORIDE AND SODIUM CHLORIDE: 900; 300 INJECTION, SOLUTION INTRAVENOUS at 21:17

## 2022-06-23 RX ADMIN — SODIUM CHLORIDE, POTASSIUM CHLORIDE, SODIUM LACTATE AND CALCIUM CHLORIDE 1000 ML: 600; 310; 30; 20 INJECTION, SOLUTION INTRAVENOUS at 20:50

## 2022-06-23 RX ADMIN — POTASSIUM CHLORIDE 40 MEQ: 750 TABLET, FILM COATED, EXTENDED RELEASE ORAL at 18:58

## 2022-06-23 RX ADMIN — HALOPERIDOL LACTATE 3 MG: 5 INJECTION, SOLUTION INTRAMUSCULAR at 19:24

## 2022-06-23 RX ADMIN — LORAZEPAM 1 MG: 1 TABLET ORAL at 23:27

## 2022-06-23 RX ADMIN — ONDANSETRON 4 MG: 2 INJECTION INTRAMUSCULAR; INTRAVENOUS at 17:03

## 2022-06-23 RX ADMIN — SODIUM CHLORIDE 1000 ML: 9 INJECTION, SOLUTION INTRAVENOUS at 17:04

## 2022-06-23 NOTE — ED NOTES
Patients IV blew again, notified dr. Amrita Zheng that patient is requesting IM meds. Patient hooked up to cardiac monitor and medications wasted with another nurse. Family at bedside. Patient known to me for more than 30 years now, h/o hypertension, atrial fibrillation over past one year and on Eliquis  Family called me over weekend because patient was weak with inability to get up and ambulate  Patient partially blind with hypertension complicated by CKD and anemia  Initially refused to go to ER but finally agreed.   Hgb 3.8 on admission with pos guiac and recd packed cell transfusion with improvement  ACs held off  Will need endoscopy.  PPIs in the meantime Patient seen and examined with niece by bedside  Somewhat groggy but now able to ambulate with use of walker and assistance as per niece  Scheduled for EGD this afternoon  Discussed with niece about danger of resuming anticoagulants and keeping in mind the risk of stroke with atrial fibrillation and niece in agreement about no continuation of anticoagulants  Discussed with house staff about anticipated discharge tomorrow morning. May need PT at home. Lives with family.  Hypertension controlled  CKD stable  Anemia of acute blood loss and CKD. Will need iron supplements Pt seen and examined.  No further melena.  Hb 8.7, PLT adequate.  Obtain coag panel.  For egd tomorrow.

## 2022-06-23 NOTE — H&P
Hospitalist Admission Note    NAME: Jane Kaiser   :  1985   MRN:  [id number]     Date/Time:  2022 7:50 PM    Patient PCP: None  _____________________________________________________________________  Given the patient's current clinical presentation, I have a high level of concern for decompensation if discharged from the emergency department. Complex decision making was performed, which includes reviewing the patient's available past medical records, laboratory results, and x-ray films. My assessment of this patient's clinical condition and my plan of care is as follows. Assessment / Plan:  75-year-old female presenting  again to our ED with abdominal pain nausea and vomiting. Has known history of type 1 diabetes, states symptoms feel similar to priorexacerbations of gastroparesis. This is the patient's Seventh  ER visit since  for the same complaint. Of note pt was in pain contract in [place] but moved here without pcp. she is a difficult stick. Pt. is on insulin pump      Recurrent Intractable N/V associated with diffuse Abd pain  Like gastroparesis exacerbation  severe hypokalemia 2.6  Likely 2/2 Vomitting  IDDM  Pt. is on insulin pump  Utox positive for  THC Barbiturates  And opiates  Of note pt was in pain contract in [place] but moved here without pcp.    7th ER visits in 6 days  Leukocytosis  13.4  UA  Positive yeast  Small LE 2plus bacteria  Acute Kidney Injury 1.6 Likely prerenal   Patient is deaf. Admit to hospitalists service  CLD and advance as tolrated  IV PPI  F/U CT abd pending  GI Consulted  K  repleted IV  And Will  Monitor  Check Mag  SSI    Check A1C  Will rx with ceftriaxone and Fluconazole for now   Will f/u Cx  IVF  F/U CBC Chem 7 in am  Cont. ssi   Check A1C  Check ekg for QTc as pt. Will need Multiple doses of antiemetics  Pti s using reglan and zofran at home           Code Status: full  Surrogate Decision Maker:  .   savita Silva Brother [phone]       DVT Prophylaxis: sq lovenox  GI Prophylaxis: not indicated    Baseline: independent        Subjective:   CHIEF COMPLAINT:  Recurrent  intracatble N/V/Abd pain  Patient arrives with complaint of abdominal pain for the past 6 days. Patient reports vomiting and has history of gastroparesis. She states she cannot eat or drink x 6 days. multiple episodes of vomiting today. She was seen here 6 times before for the same in our ED          HISTORY OF PRESENT ILLNESS:     Sukhjinder Tubbs is a 42 y.o.  female who presents with  recurrent N/V and  abd pain . This is the patient's Seventh  ER visit since June 17 for the same complaint. She was seen in our ER last night, and signed out [address] She has had constant abdominal pain which is primarily on the left abdomen. She denies back pain or chest pain. She is vomiting multiple times a day. She says she can't eat or drink. She denies fever or chills. She denies cough, dysuria or change in bowel habits. Her pain is currently a 10 out of 10 in severity. She has not had any imaging of her abdomen on any of her prior ER visits. she is a difficult stick. Patient is deaf. Patient states that for the past 6 days. she has had diffuse abdominal pain, has a history of gastroparesis and states this feels like her gastroparesis       We were asked to admit for work up and evaluation of the above problems. Past Medical History:   Diagnosis Date    Anxiety     Deaf     Fibromyalgia     Musculoskeletal disorder     back problems    Pleurisy         No past surgical history on file. Social History     Tobacco Use    Smoking status: Current Every Day Smoker    Smokeless tobacco: Not on file   Substance Use Topics    Alcohol use: No        No family history on file.   Allergies   Allergen Reactions    Codeine Nausea and Vomiting    Erythromycin Nausea and Vomiting    Ibuprofen Nausea and Vomiting        Prior to Admission medications    Medication Sig Start Date End Date Taking? Authorizing Provider   BUSPIRONE HCL (BUSPAR PO) Take  by mouth. Other, MD Teo       REVIEW OF SYSTEMS:     I am not able to complete the review of systems because: The patient is intubated and sedated    The patient has altered mental status due to his acute medical problems    The patient has baseline aphasia from prior stroke(s)    The patient has baseline dementia and is not reliable historian    The patient is in acute medical distress and unable to provide information           Constitutional: Negative for fever. HENT: Negative for congestion. Eyes: Negative. Respiratory: Negative for shortness of breath. Cardiovascular: Negative for chest pain. Gastrointestinal: Positive for abdominal pain, nausea and vomiting. Endocrine: Negative for heat intolerance. Genitourinary: Negative. Musculoskeletal: Negative for back pain. Skin: Negative for rash. Allergic/Immunologic: Negative for immunocompromised state. Neurological: Positive for light-headedness. Hematological: Does not bruise/bleed easily. Psychiatric/Behavioral: Negative. All other systems reviewed and are negative. Objective:   VITALS:    Visit Vitals  BP (!) 131/105   Pulse (!) 118   Temp 97.9 °F (36.6 °C)   Resp 16   Ht 5' 4\" (1.626 m)   Wt 60.5 kg (133 lb 6.1 oz)   SpO2 93%   BMI 22.89 kg/m²       PHYSICAL EXAM:    Constitutional:       General: She is not in acute distress. Appearance: She is well-developed. HENT:      Head: Normocephalic. Cardiovascular:      Rate and Rhythm: Regular rhythm. Tachycardia present. Heart sounds: Normal heart sounds. Pulmonary:      Effort: Pulmonary effort is normal.      Breath sounds: Normal breath sounds. Abdominal:      General: Bowel sounds are normal.      Palpations: Abdomen is soft. Tenderness:  There is abdominal tenderness in the right upper quadrant, right lower quadrant, left upper quadrant and left lower quadrant. Musculoskeletal:         General: Normal range of motion. Cervical back: Normal range of motion and neck supple. Skin:     General: Skin is warm and dry. Neurological:      General: No focal deficit present. Mental Status: She is alert and oriented to person, place, and time. Psychiatric:         Mood and Affect: Mood normal.        _______________________________________________________________________  Care Plan discussed with:    Comments   Patient     Family      RN     Care Manager                    Consultant:      _______________________________________________________________________  Expected  Disposition:   Home with Family    HH/PT/OT/RN    SNF/LTC    LUZ    ________________________________________________________________________  TOTAL TIME:    Minutes    Critical Care Provided     Minutes non procedure based      Comments     Reviewed previous records   >50% of visit spent in counseling and coordination of care  Discussion with patient and/or family and questions answered       Given the patient's current clinical presentation, I have a high level of concern for decompensation if discharged from the ED. Complex decision making was performed which includes reviewing the patient's available past medical records, laboratory results, and Xray films. I have also directly communicated my plan and discussed this case with the involved ED physician.     ____________________________________________________________________  Ellen Mobley MD    Procedures: see electronic medical records for all procedures/Xrays and details which were not copied into this note but were reviewed prior to creation of Plan.     LAB DATA REVIEWED:    Recent Results (from the past 24 hour(s))   DRUG SCREEN, URINE    Collection Time: 06/23/22  1:14 PM   Result Value Ref Range    AMPHETAMINES Negative NEG      BARBITURATES Positive (A) NEG      BENZODIAZEPINES Negative NEG COCAINE Negative NEG      METHADONE Negative NEG      OPIATES Positive (A) NEG      PCP(PHENCYCLIDINE) Negative NEG      THC (TH-CANNABINOL) Positive (A) NEG      Drug screen comment (NOTE)    ACETAMINOPHEN    Collection Time: 06/23/22  1:16 PM   Result Value Ref Range    Acetaminophen level 11 10 - 30 ug/mL   SALICYLATE    Collection Time: 06/23/22  1:16 PM   Result Value Ref Range    Salicylate level 16.4 2.8 - 20.0 MG/DL   SAMPLES BEING HELD    Collection Time: 06/23/22  1:16 PM   Result Value Ref Range    SAMPLES BEING HELD GLD     COMMENT        Add-on orders for these samples will be processed based on acceptable specimen integrity and analyte stability, which may vary by analyte. CBC WITH AUTOMATED DIFF    Collection Time: 06/23/22  2:37 PM   Result Value Ref Range    WBC 13.4 (H) 3.6 - 11.0 K/uL    RBC 4.36 3.80 - 5.20 M/uL    HGB 13.3 11.5 - 16.0 g/dL    HCT 39.9 35.0 - 47.0 %    MCV 91.5 80.0 - 99.0 FL    MCH 30.5 26.0 - 34.0 PG    MCHC 33.3 30.0 - 36.5 g/dL    RDW 15.1 (H) 11.5 - 14.5 %    PLATELET 816 (H) 279 - 400 K/uL    MPV 10.3 8.9 - 12.9 FL    NRBC 0.0 0  WBC    ABSOLUTE NRBC 0.00 0.00 - 0.01 K/uL    NEUTROPHILS 76 (H) 32 - 75 %    LYMPHOCYTES 15 12 - 49 %    MONOCYTES 6 5 - 13 %    EOSINOPHILS 2 0 - 7 %    BASOPHILS 0 0 - 1 %    IMMATURE GRANULOCYTES 1 (H) 0.0 - 0.5 %    ABS. NEUTROPHILS 10.3 (H) 1.8 - 8.0 K/UL    ABS. LYMPHOCYTES 2.0 0.8 - 3.5 K/UL    ABS. MONOCYTES 0.8 0.0 - 1.0 K/UL    ABS. EOSINOPHILS 0.2 0.0 - 0.4 K/UL    ABS. BASOPHILS 0.1 0.0 - 0.1 K/UL    ABS. IMM.  GRANS. 0.1 (H) 0.00 - 0.04 K/UL    DF AUTOMATED     METABOLIC PANEL, COMPREHENSIVE    Collection Time: 06/23/22  2:37 PM   Result Value Ref Range    Sodium 137 136 - 145 mmol/L    Potassium 2.6 (LL) 3.5 - 5.1 mmol/L    Chloride 104 97 - 108 mmol/L    CO2 21 21 - 32 mmol/L    Anion gap 12 5 - 15 mmol/L    Glucose 143 (H) 65 - 100 mg/dL    BUN 30 (H) 6 - 20 MG/DL    Creatinine 1.60 (H) 0.55 - 1.02 MG/DL    BUN/Creatinine ratio 19 12 - 20      GFR est AA 43 (L) >60 ml/min/1.73m2    GFR est non-AA 36 (L) >60 ml/min/1.73m2    Calcium 9.0 8.5 - 10.1 MG/DL    Bilirubin, total 0.3 0.2 - 1.0 MG/DL    ALT (SGPT) 116 (H) 12 - 78 U/L    AST (SGOT) 95 (H) 15 - 37 U/L    Alk.  phosphatase 95 45 - 117 U/L    Protein, total 8.0 6.4 - 8.2 g/dL    Albumin 4.2 3.5 - 5.0 g/dL    Globulin 3.8 2.0 - 4.0 g/dL    A-G Ratio 1.1 1.1 - 2.2

## 2022-06-24 ENCOUNTER — APPOINTMENT (OUTPATIENT)
Dept: CT IMAGING | Age: 37
DRG: 074 | End: 2022-06-24
Attending: GENERAL ACUTE CARE HOSPITAL
Payer: MEDICARE

## 2022-06-24 LAB
ALBUMIN SERPL-MCNC: 3.4 G/DL (ref 3.5–5)
ALBUMIN/GLOB SERPL: 1 {RATIO} (ref 1.1–2.2)
ALP SERPL-CCNC: 76 U/L (ref 45–117)
ALT SERPL-CCNC: 93 U/L (ref 12–78)
ANION GAP SERPL CALC-SCNC: 7 MMOL/L (ref 5–15)
AST SERPL-CCNC: 74 U/L (ref 15–37)
BASOPHILS # BLD: 0 K/UL (ref 0–0.1)
BASOPHILS NFR BLD: 1 % (ref 0–1)
BILIRUB SERPL-MCNC: 0.3 MG/DL (ref 0.2–1)
BUN SERPL-MCNC: 19 MG/DL (ref 6–20)
BUN/CREAT SERPL: 17 (ref 12–20)
CALCIUM SERPL-MCNC: 8.1 MG/DL (ref 8.5–10.1)
CHLORIDE SERPL-SCNC: 108 MMOL/L (ref 97–108)
CO2 SERPL-SCNC: 22 MMOL/L (ref 21–32)
CREAT SERPL-MCNC: 1.1 MG/DL (ref 0.55–1.02)
DIFFERENTIAL METHOD BLD: ABNORMAL
EOSINOPHIL # BLD: 0.3 K/UL (ref 0–0.4)
EOSINOPHIL NFR BLD: 3 % (ref 0–7)
ERYTHROCYTE [DISTWIDTH] IN BLOOD BY AUTOMATED COUNT: 15.4 % (ref 11.5–14.5)
EST. AVERAGE GLUCOSE BLD GHB EST-MCNC: 192 MG/DL
GLOBULIN SER CALC-MCNC: 3.3 G/DL (ref 2–4)
GLUCOSE SERPL-MCNC: 117 MG/DL (ref 65–100)
HBA1C MFR BLD: 8.3 % (ref 4–5.6)
HCT VFR BLD AUTO: 34 % (ref 35–47)
HGB BLD-MCNC: 10.9 G/DL (ref 11.5–16)
IMM GRANULOCYTES # BLD AUTO: 0 K/UL (ref 0–0.04)
IMM GRANULOCYTES NFR BLD AUTO: 1 % (ref 0–0.5)
LYMPHOCYTES # BLD: 2.1 K/UL (ref 0.8–3.5)
LYMPHOCYTES NFR BLD: 25 % (ref 12–49)
MAGNESIUM SERPL-MCNC: 2.5 MG/DL (ref 1.6–2.4)
MCH RBC QN AUTO: 30.4 PG (ref 26–34)
MCHC RBC AUTO-ENTMCNC: 32.1 G/DL (ref 30–36.5)
MCV RBC AUTO: 94.7 FL (ref 80–99)
MONOCYTES # BLD: 0.6 K/UL (ref 0–1)
MONOCYTES NFR BLD: 8 % (ref 5–13)
NEUTS SEG # BLD: 5.1 K/UL (ref 1.8–8)
NEUTS SEG NFR BLD: 62 % (ref 32–75)
NRBC # BLD: 0 K/UL (ref 0–0.01)
NRBC BLD-RTO: 0 PER 100 WBC
PLATELET # BLD AUTO: 335 K/UL (ref 150–400)
PMV BLD AUTO: 10.2 FL (ref 8.9–12.9)
POTASSIUM SERPL-SCNC: 4.4 MMOL/L (ref 3.5–5.1)
PROT SERPL-MCNC: 6.7 G/DL (ref 6.4–8.2)
RBC # BLD AUTO: 3.59 M/UL (ref 3.8–5.2)
SODIUM SERPL-SCNC: 137 MMOL/L (ref 136–145)
WBC # BLD AUTO: 8.1 K/UL (ref 3.6–11)

## 2022-06-24 PROCEDURE — 74011250636 HC RX REV CODE- 250/636: Performed by: NURSE PRACTITIONER

## 2022-06-24 PROCEDURE — 74011250637 HC RX REV CODE- 250/637: Performed by: NURSE PRACTITIONER

## 2022-06-24 PROCEDURE — 74011250637 HC RX REV CODE- 250/637: Performed by: GENERAL ACUTE CARE HOSPITAL

## 2022-06-24 PROCEDURE — 74011000636 HC RX REV CODE- 636: Performed by: GENERAL ACUTE CARE HOSPITAL

## 2022-06-24 PROCEDURE — 36415 COLL VENOUS BLD VENIPUNCTURE: CPT

## 2022-06-24 PROCEDURE — 74177 CT ABD & PELVIS W/CONTRAST: CPT

## 2022-06-24 PROCEDURE — 80053 COMPREHEN METABOLIC PANEL: CPT

## 2022-06-24 PROCEDURE — C9113 INJ PANTOPRAZOLE SODIUM, VIA: HCPCS | Performed by: HOSPITALIST

## 2022-06-24 PROCEDURE — 74011250636 HC RX REV CODE- 250/636: Performed by: HOSPITALIST

## 2022-06-24 PROCEDURE — 74011000250 HC RX REV CODE- 250: Performed by: HOSPITALIST

## 2022-06-24 PROCEDURE — 83735 ASSAY OF MAGNESIUM: CPT

## 2022-06-24 PROCEDURE — 85025 COMPLETE CBC W/AUTO DIFF WBC: CPT

## 2022-06-24 PROCEDURE — 65270000029 HC RM PRIVATE

## 2022-06-24 PROCEDURE — 74011000258 HC RX REV CODE- 258: Performed by: HOSPITALIST

## 2022-06-24 RX ORDER — LORAZEPAM 1 MG/1
1 TABLET ORAL
Status: DISCONTINUED | OUTPATIENT
Start: 2022-06-24 | End: 2022-06-25 | Stop reason: HOSPADM

## 2022-06-24 RX ORDER — HYDROXYZINE 25 MG/1
25 TABLET, FILM COATED ORAL ONCE
Status: COMPLETED | OUTPATIENT
Start: 2022-06-24 | End: 2022-06-24

## 2022-06-24 RX ORDER — HYDROMORPHONE HYDROCHLORIDE 1 MG/ML
0.5 INJECTION, SOLUTION INTRAMUSCULAR; INTRAVENOUS; SUBCUTANEOUS ONCE
Status: COMPLETED | OUTPATIENT
Start: 2022-06-24 | End: 2022-06-24

## 2022-06-24 RX ADMIN — MORPHINE SULFATE 1 MG: 2 INJECTION, SOLUTION INTRAMUSCULAR; INTRAVENOUS at 05:06

## 2022-06-24 RX ADMIN — ONDANSETRON 4 MG: 2 INJECTION INTRAMUSCULAR; INTRAVENOUS at 22:27

## 2022-06-24 RX ADMIN — IOPAMIDOL 100 ML: 755 INJECTION, SOLUTION INTRAVENOUS at 10:51

## 2022-06-24 RX ADMIN — MORPHINE SULFATE 1 MG: 2 INJECTION, SOLUTION INTRAMUSCULAR; INTRAVENOUS at 08:03

## 2022-06-24 RX ADMIN — ONDANSETRON 4 MG: 2 INJECTION INTRAMUSCULAR; INTRAVENOUS at 05:06

## 2022-06-24 RX ADMIN — FLUCONAZOLE 200 MG: 200 INJECTION, SOLUTION INTRAVENOUS at 22:27

## 2022-06-24 RX ADMIN — CEFTRIAXONE 1 G: 1 INJECTION, POWDER, FOR SOLUTION INTRAMUSCULAR; INTRAVENOUS at 21:25

## 2022-06-24 RX ADMIN — MORPHINE SULFATE 1 MG: 2 INJECTION, SOLUTION INTRAMUSCULAR; INTRAVENOUS at 13:54

## 2022-06-24 RX ADMIN — POTASSIUM CHLORIDE AND SODIUM CHLORIDE: 900; 300 INJECTION, SOLUTION INTRAVENOUS at 23:28

## 2022-06-24 RX ADMIN — MORPHINE SULFATE 1 MG: 2 INJECTION, SOLUTION INTRAMUSCULAR; INTRAVENOUS at 17:08

## 2022-06-24 RX ADMIN — ONDANSETRON 4 MG: 2 INJECTION INTRAMUSCULAR; INTRAVENOUS at 13:58

## 2022-06-24 RX ADMIN — ONDANSETRON 4 MG: 2 INJECTION INTRAMUSCULAR; INTRAVENOUS at 08:02

## 2022-06-24 RX ADMIN — SODIUM CHLORIDE, PRESERVATIVE FREE 10 ML: 5 INJECTION INTRAVENOUS at 21:31

## 2022-06-24 RX ADMIN — LORAZEPAM 1 MG: 1 TABLET ORAL at 18:35

## 2022-06-24 RX ADMIN — HYDROMORPHONE HYDROCHLORIDE 0.5 MG: 1 INJECTION, SOLUTION INTRAMUSCULAR; INTRAVENOUS; SUBCUTANEOUS at 02:19

## 2022-06-24 RX ADMIN — SODIUM CHLORIDE 40 MG: 9 INJECTION, SOLUTION INTRAMUSCULAR; INTRAVENOUS; SUBCUTANEOUS at 08:02

## 2022-06-24 RX ADMIN — HYDROXYZINE HYDROCHLORIDE 25 MG: 25 TABLET, FILM COATED ORAL at 05:59

## 2022-06-24 RX ADMIN — MORPHINE SULFATE 1 MG: 2 INJECTION, SOLUTION INTRAMUSCULAR; INTRAVENOUS at 21:29

## 2022-06-24 NOTE — PROGRESS NOTES
Physician Progress Note      Noel Main  CSN #:                  [id number]  :                       1985  ADMIT DATE:       2022 4:11 PM  [address] [place] DATE:  RESPONDING  PROVIDER #:        Dany Hickman MD          QUERY TEXT:    Pt admitted with Recurrent Intractable N/V likely Gastroparesis exacerbation. Pt noted to have  type 1 diabetes on insulin pump. If possible, please document in progress notes and discharge summary the relationship, if any, between nausea/vomiting with gastroparesis and Diabetes Mellitus: The medical record reflects the following:  Risk Factors: 37yoF w/ type 1 DM on insulin pump, hx gastroparesis, chronic opiod use    Clinical Indicators: c/o diffuse abdominal pain with nausea and vomiting ; A1c 8.3 with estimated average glucose 192; Glucose 143, hypokalemia  Treatment: continue reglan & Zofran, IV protonix, IVF bolus, SSI. Thank you,  Yarelis Villasenor RN, CDI  Options provided:  -- Nausea/vomiting with Gastroparesis due to DM  -- Nausea/vomiting with Gastroparesis is unrelated to DM  -- Other - I will add my own diagnosis  -- Disagree - Not applicable / Not valid  -- Disagree - Clinically unable to determine / Unknown  -- Refer to Clinical Documentation Reviewer    PROVIDER RESPONSE TEXT:    Provider is clinically unable to determine a response to this query.     Query created by: Nuris Da Silva on 2022 10:15 AM      Electronically signed by:  Dany Hickman MD 2022 12:52 PM

## 2022-06-24 NOTE — ED NOTES
Pt given orange juice at this time due to pt personal meter showing sugar running at 85mg/dL.  Pt in NAD and VS stable, A&Ox4, tolerating PO fluids w/o issue

## 2022-06-24 NOTE — ED NOTES
Pt refusing finger stick at this time, tech in room trying to obtain FSBS, pt refused. Pt family member stating that pt needs a break from fluids and iv administration. Pt ambulatory to and from restroom at this time w/o issue.

## 2022-06-24 NOTE — ED NOTES
Pt family member call out for RN, family member requesting to unhook pt from IV and monitor so pt can go outside and \"use her medical marijuana that she is prescribed in [place] and then come back inside. \" RN informs pt that that can not be done and discussed with charge RN

## 2022-06-24 NOTE — ED PROVIDER NOTES
EMERGENCY DEPARTMENT HISTORY AND PHYSICAL EXAM      Date: 6/23/2022  Patient Name: Jose Alejandro Heaton    History of Presenting Illness     Chief Complaint   Patient presents with    Abdominal Pain     here yesterday for the abdominal pain and day before . pt has not eaten. she had refused the medical screening for rehab the past two days. she is having withdrawal side effects sister and sister in law are with pt.  Medication Problem     wants to be evaluated for rehab.  Gastroparesis       History Provided By: Patient and Patient's Sister    HPI: Jose Alejandro Heaton, 42 y.o. female with PMHx significant for diabetes, fibromyalgia, gastroparesis, chronic opioid use, who presents with a chief complaint of abdominal pain, nausea, and vomiting for the last week. Patient apparently has not been sleeping and has been unable to tolerate p.o. She has been in the emergency department the 6 times in the last 6 days with similar symptoms and has signed out [address] multiple times. Patient ideally would like to follow-up With her weathers and [facility] video  for sign language, instead using her sister's . Sister states that they called [facility] about getting the patient into a methadone clinic and they told them she needed to be medically cleared first.  Patient was previously on Suboxone but but tells me that was \"too strong. \"  She previously was followed by pain management in [place] but has not established care since she got here. She is requesting that I prescribe her methadone. PCP: None    There are no other complaints, changes, or physical findings at this time.     Current Facility-Administered Medications   Medication Dose Route Frequency Provider Last Rate Last Admin    iopamidoL (ISOVUE-370) 76 % injection 100 mL  100 mL IntraVENous RAD ONCE Deb Triana MD        sodium chloride (NS) flush 5-40 mL  5-40 mL IntraVENous Q8H Lalitha Fernandez MD        sodium chloride (NS) flush 5-40 mL  5-40 mL IntraVENous PRN Pam Fernandez MD        acetaminophen (TYLENOL) tablet 650 mg  650 mg Oral Q6H PRN Pam Fernandez MD        Or    acetaminophen (TYLENOL) suppository 650 mg  650 mg Rectal Q6H PRN Pam Fernandez MD        polyethylene glycol (MIRALAX) packet 17 g  17 g Oral DAILY PRN Pam Fernandez MD        ondansetron (ZOFRAN ODT) tablet 4 mg  4 mg Oral Q8H PRN Pam Fernandez MD        Or    ondansetron (ZOFRAN) injection 4 mg  4 mg IntraVENous Q6H PRN Pam Fernandez MD        enoxaparin (LOVENOX) injection 40 mg  40 mg SubCUTAneous DAILY Pam Fernandez MD        cefTRIAXone (ROCEPHIN) 1 g in 0.9% sodium chloride (MBP/ADV) 50 mL MBP  1 g IntraVENous Q24H Pam Fernandez MD        fluconazole (DIFLUCAN) 200mg/100 mL IVPB (premix)  200 mg IntraVENous Q24H Pam Fernandez MD        pantoprazole (PROTONIX) 40 mg in 0.9% sodium chloride 10 mL injection  40 mg IntraVENous DAILY Pam Fernandez MD        0.9% sodium chloride with KCl 40 mEq/L infusion   IntraVENous CONTINUOUS Judi Parsons  mL/hr at 06/23/22 2117 New Bag at 06/23/22 2117    morphine injection 1 mg  1 mg IntraVENous Q3H PRN Judi Parsons MD   1 mg at 06/23/22 2348    insulin lispro (HUMALOG) injection   SubCUTAneous AC&HS Pam Fernandez MD        glucose chewable tablet 16 g  4 Tablet Oral PRN Pam Fernandez MD        glucagon (GLUCAGEN) injection 1 mg  1 mg IntraMUSCular PRN Pam Fernandez MD        dextrose 10% infusion 0-250 mL  0-250 mL IntraVENous PRN Pam Fernandez MD        nicotine (NICODERM CQ) 21 mg/24 hr patch 1 Patch  1 Patch TransDERmal Q24H Roxana Wallis NP   1 Patch at 06/23/22 2864     Current Outpatient Medications   Medication Sig Dispense Refill    dicyclomine (BENTYL) 20 mg tablet Take  by mouth.  insulin aspart U-100 (NOVOLOG) 100 unit/mL injection by SubCUTAneous route.  BUSPIRONE HCL (BUSPAR PO) Take  by mouth.        Past History     Past Medical History:  Past Medical History: Diagnosis Date    Anxiety     Deaf     Fibromyalgia     Musculoskeletal disorder     back problems    Pleurisy      Past Surgical History:  No past surgical history on file. Family History:  No family history on file. Social History:  Social History     Tobacco Use    Smoking status: Current Every Day Smoker    Smokeless tobacco: Not on file   Substance Use Topics    Alcohol use: No    Drug use: Yes     Types: Marijuana     Allergies: Allergies   Allergen Reactions    Codeine Nausea and Vomiting    Erythromycin Nausea and Vomiting    Ibuprofen Nausea and Vomiting     Review of Systems   Review of Systems   Constitutional: Negative for chills and fever. HENT: Negative for congestion, rhinorrhea and sore throat. Respiratory: Negative for cough and shortness of breath. Cardiovascular: Negative for chest pain. Gastrointestinal: Positive for abdominal pain, nausea and vomiting. Genitourinary: Negative for dysuria and urgency. Skin: Negative for rash. Neurological: Negative for dizziness, light-headedness and headaches. All other systems reviewed and are negative. Physical Exam   Physical Exam  Vitals and nursing note reviewed. Constitutional:       General: She is in acute distress. Appearance: She is well-developed. Comments: Pacing in room, refusing to sit on bed   HENT:      Head: Normocephalic and atraumatic. Eyes:      Conjunctiva/sclera: Conjunctivae normal.      Pupils: Pupils are equal, round, and reactive to light. Cardiovascular:      Rate and Rhythm: Regular rhythm. Tachycardia present. Pulmonary:      Effort: Pulmonary effort is normal. No respiratory distress. Breath sounds: Normal breath sounds. No stridor. Abdominal:      General: There is no distension. Palpations: Abdomen is soft. Tenderness: There is no abdominal tenderness. Musculoskeletal:         General: Normal range of motion. Cervical back: Normal range of motion. Skin:     General: Skin is warm and dry. Neurological:      Mental Status: She is alert and oriented to person, place, and time. Diagnostic Study Results   Labs -     Recent Results (from the past 12 hour(s))   DRUG SCREEN, URINE    Collection Time: 06/23/22  1:14 PM   Result Value Ref Range    AMPHETAMINES Negative NEG      BARBITURATES Positive (A) NEG      BENZODIAZEPINES Negative NEG      COCAINE Negative NEG      METHADONE Negative NEG      OPIATES Positive (A) NEG      PCP(PHENCYCLIDINE) Negative NEG      THC (TH-CANNABINOL) Positive (A) NEG      Drug screen comment (NOTE)    ACETAMINOPHEN    Collection Time: 06/23/22  1:16 PM   Result Value Ref Range    Acetaminophen level 11 10 - 30 ug/mL   SALICYLATE    Collection Time: 06/23/22  1:16 PM   Result Value Ref Range    Salicylate level 04.0 2.8 - 20.0 MG/DL   SAMPLES BEING HELD    Collection Time: 06/23/22  1:16 PM   Result Value Ref Range    SAMPLES BEING HELD GLD     COMMENT        Add-on orders for these samples will be processed based on acceptable specimen integrity and analyte stability, which may vary by analyte. CBC WITH AUTOMATED DIFF    Collection Time: 06/23/22  2:37 PM   Result Value Ref Range    WBC 13.4 (H) 3.6 - 11.0 K/uL    RBC 4.36 3.80 - 5.20 M/uL    HGB 13.3 11.5 - 16.0 g/dL    HCT 39.9 35.0 - 47.0 %    MCV 91.5 80.0 - 99.0 FL    MCH 30.5 26.0 - 34.0 PG    MCHC 33.3 30.0 - 36.5 g/dL    RDW 15.1 (H) 11.5 - 14.5 %    PLATELET 031 (H) 448 - 400 K/uL    MPV 10.3 8.9 - 12.9 FL    NRBC 0.0 0  WBC    ABSOLUTE NRBC 0.00 0.00 - 0.01 K/uL    NEUTROPHILS 76 (H) 32 - 75 %    LYMPHOCYTES 15 12 - 49 %    MONOCYTES 6 5 - 13 %    EOSINOPHILS 2 0 - 7 %    BASOPHILS 0 0 - 1 %    IMMATURE GRANULOCYTES 1 (H) 0.0 - 0.5 %    ABS. NEUTROPHILS 10.3 (H) 1.8 - 8.0 K/UL    ABS. LYMPHOCYTES 2.0 0.8 - 3.5 K/UL    ABS. MONOCYTES 0.8 0.0 - 1.0 K/UL    ABS. EOSINOPHILS 0.2 0.0 - 0.4 K/UL    ABS. BASOPHILS 0.1 0.0 - 0.1 K/UL    ABS. IMM.  GRANS. 0.1 (H) 0.00 - 0.04 K/UL    DF AUTOMATED     METABOLIC PANEL, COMPREHENSIVE    Collection Time: 06/23/22  2:37 PM   Result Value Ref Range    Sodium 137 136 - 145 mmol/L    Potassium 2.6 (LL) 3.5 - 5.1 mmol/L    Chloride 104 97 - 108 mmol/L    CO2 21 21 - 32 mmol/L    Anion gap 12 5 - 15 mmol/L    Glucose 143 (H) 65 - 100 mg/dL    BUN 30 (H) 6 - 20 MG/DL    Creatinine 1.60 (H) 0.55 - 1.02 MG/DL    BUN/Creatinine ratio 19 12 - 20      GFR est AA 43 (L) >60 ml/min/1.73m2    GFR est non-AA 36 (L) >60 ml/min/1.73m2    Calcium 9.0 8.5 - 10.1 MG/DL    Bilirubin, total 0.3 0.2 - 1.0 MG/DL    ALT (SGPT) 116 (H) 12 - 78 U/L    AST (SGOT) 95 (H) 15 - 37 U/L    Alk. phosphatase 95 45 - 117 U/L    Protein, total 8.0 6.4 - 8.2 g/dL    Albumin 4.2 3.5 - 5.0 g/dL    Globulin 3.8 2.0 - 4.0 g/dL    A-G Ratio 1.1 1.1 - 2.2     HCG QL SERUM    Collection Time: 06/23/22  2:37 PM   Result Value Ref Range    HCG, Ql. Negative NEG     LIPASE    Collection Time: 06/23/22  2:37 PM   Result Value Ref Range    Lipase 47 (L) 73 - 393 U/L   MAGNESIUM    Collection Time: 06/23/22  2:37 PM   Result Value Ref Range    Magnesium 2.8 (H) 1.6 - 2.4 mg/dL   GLUCOSE, POC    Collection Time: 06/23/22  9:02 PM   Result Value Ref Range    Glucose (POC) 124 (H) 65 - 117 mg/dL    Performed by Ankit WILLETT        Radiologic Studies -   No orders to display     No results found. Medical Decision Making   I am the first provider for this patient. I reviewed the vital signs, available nursing notes, past medical history, past surgical history, family history and social history. Vital Signs-Reviewed the patient's vital signs.   Patient Vitals for the past 12 hrs:   Temp Pulse Resp BP SpO2   06/23/22 2247 -- (!) 102 13 (!) 129/92 100 %   06/23/22 2052 -- (!) 116 20 (!) 166/108 97 %   06/23/22 1901 -- (!) 118 16 (!) 131/105 93 %   06/23/22 1756 -- -- 18 -- --   06/23/22 1246 97.9 °F (36.6 °C) (!) 116 22 (!) 145/96 100 %       Pulse Oximetry Analysis - 100% on ra      Records Reviewed: Nursing Notes and Old Medical Records    Provider Notes (Medical Decision Making):   Patient presents with abdominal pain, nausea, vomiting. Multiple ED visits for same after which patient signed out [address] Discussed that I am not a methadone prescriber but can give him a list of methadone clinics, however I am concerned given multiple visits with similar symptoms that there could be more going on but she could have significant electrolyte derangements. Plan for basic lab work, will medicate, give IV fluids. ED Course:   Initial assessment performed. The patients presenting problems have been discussed, and they are in agreement with the care plan formulated and outlined with them. I have encouraged them to ask questions as they arise throughout their visit. Labs notable for hypokalemia. Patient continues to complain of ongoing pain despite medication. Patient admitted to hospitalist for further management. ED Course as of 06/24/22 0007   Thu Jun 23, 2022   2302 Pt refused CT scan [AYANA]      ED Course User Index  Lane Mcclain MD       Procedures:  Procedures    Critical Care:  none    Disposition:    Admission Note:  Patient is being admitted to the hospital by Dr. Tere Herrera, Service: Hospitalist.  The results of their tests and reasons for their admission have been discussed with them and available family. They convey agreement and understanding for the need to be admitted and for their admission diagnosis. Diagnosis     Clinical Impression:   1. Intractable abdominal pain    2. Hypokalemia    3. HIGINIO (acute kidney injury) [facility])            Please note that this dictation was completed with [employer], the computer voice recognition software. Quite often unanticipated grammatical, syntax, homophones, and other interpretive errors are inadvertently transcribed by the computer software. Please disregard these errors.   Please excuse any errors that have escaped final proofreading

## 2022-06-24 NOTE — ED NOTES
Pt remains seated at bedside, refusing to get in the bed. Pt family member managing pt's IV pump administering medications through pump. Pt FSBS was 124 through facility glucometer.

## 2022-06-24 NOTE — ROUTINE PROCESS
Bedside shift change report given to elicia FARMER (oncoming nurse) by Cam WEBB RN (offgoing nurse). Report included the following information SBAR, Kardex and MAR.

## 2022-06-24 NOTE — PROGRESS NOTES
Late entry from 6/23/22: CM provided list of local Methadone clinics to ED provider to provide to pt as pt/family asking for pt to be started on Methadone.       Teresa Quispe [facility] [address], [address]

## 2022-06-24 NOTE — ED NOTES
Met with hospitalist in hospitalist room to discuss pt after calling twice w/o answer and attempting to reach through perfect serve. Case also discussed with charge RN. MD hospitalist requesting to document pt refusal of everything.

## 2022-06-24 NOTE — CONSULTS
Gastroenterology Consult  (Susan Marquis, [place]   for Dr. Ashanti Smith)     Referring Physician: Dr. Emmett Simms Date: 6/24/2022     Subjective:     Chief Complaint: nausea, vomiting    History of Present Illness: Enedina Velazquez is a 42 y.o. female who is seen in consultation for nausea and voiting. The patient is deaf and a sign language interpretor was used to obtain the history from the patient. She moved here from [place] with her sister a month ago because her sister lost her job and now her car. There is a strong FH of anxiety and patient self reports significant generalized anxiety. As a result she smokes marijuana 2-3 times per day and sometimes more to control her anxiety. Has never seen psychiatry. Severe N/V started 8 days ago. The smell of food made her nauseous. States she takes hot baths. She has had 7 ER visits x 6/17/22. Prior to that had multiple visits at [facility] in the ER and has an appointment with [facility] GI clinic. The past 2 days she has felt better. She hasn't smoked any marijuana in 2 days and has been getting Zofran and feels it is helping. She is hungry and wants to eat. She has type 1 diabetes with hgbA1C 8.3. Reports hx of gastroparesis and taking reglan. She has been receiving morphine here but denies taking any opioids in the past 3 weeks. Apparently had a pain contract in [place] and has taken suboxone but it was [employer] and currently looking for a methadone clinic. States she has a lot of pain and fibromyalgia and hx of femur fracture. Reports hx of EGD \"a while ago\" and she had an \"ulcer. \"  She takes 2 Excedrin every AM for headaches. Denies black stools. Her stools are dark green. She takes a lot of pepto bismol. No hematemesis or coffee ground emesis. States her emesis is yelow like bile.          Past Medical History:   Diagnosis Date    Anxiety     Deaf     Fibromyalgia     Musculoskeletal disorder     back problems    Pleurisy      No past surgical history on file. No family history on file.   Social History     Tobacco Use    Smoking status: Current Every Day Smoker    Smokeless tobacco: Not on file   Substance Use Topics    Alcohol use: No      Allergies   Allergen Reactions    Codeine Nausea and Vomiting    Erythromycin Nausea and Vomiting    Ibuprofen Nausea and Vomiting     Current Facility-Administered Medications   Medication Dose Route Frequency    iopamidoL (ISOVUE-370) 76 % injection        LORazepam (ATIVAN) tablet 1 mg  1 mg Oral Q8H PRN    sodium chloride (NS) flush 5-40 mL  5-40 mL IntraVENous Q8H    sodium chloride (NS) flush 5-40 mL  5-40 mL IntraVENous PRN    acetaminophen (TYLENOL) tablet 650 mg  650 mg Oral Q6H PRN    Or    acetaminophen (TYLENOL) suppository 650 mg  650 mg Rectal Q6H PRN    polyethylene glycol (MIRALAX) packet 17 g  17 g Oral DAILY PRN    ondansetron (ZOFRAN ODT) tablet 4 mg  4 mg Oral Q8H PRN    Or    ondansetron (ZOFRAN) injection 4 mg  4 mg IntraVENous Q6H PRN    enoxaparin (LOVENOX) injection 40 mg  40 mg SubCUTAneous DAILY    cefTRIAXone (ROCEPHIN) 1 g in 0.9% sodium chloride (MBP/ADV) 50 mL MBP  1 g IntraVENous Q24H    fluconazole (DIFLUCAN) 200mg/100 mL IVPB (premix)  200 mg IntraVENous Q24H    pantoprazole (PROTONIX) 40 mg in 0.9% sodium chloride 10 mL injection  40 mg IntraVENous DAILY    0.9% sodium chloride with KCl 40 mEq/L infusion   IntraVENous CONTINUOUS    morphine injection 1 mg  1 mg IntraVENous Q3H PRN    insulin lispro (HUMALOG) injection   SubCUTAneous AC&HS    glucose chewable tablet 16 g  4 Tablet Oral PRN    glucagon (GLUCAGEN) injection 1 mg  1 mg IntraMUSCular PRN    dextrose 10% infusion 0-250 mL  0-250 mL IntraVENous PRN    nicotine (NICODERM CQ) 21 mg/24 hr patch 1 Patch  1 Patch TransDERmal Q24H        Review of Systems:  A detailed review of systems was performed as follows:  Constitutional:  Negative  Eyes:  No ocular sensitivity to the sun, blurred vision or double vision. ENMT:  No nose or sinus problems. Respiratory: No coughing, wheezing or sob  Cardiac:  No chest pain, exertional chest pain or palpitations  Gastrointestinal:  See history of the present illness  :   No pain with urination or hematuria  Musculoskeletal:  +remote femur fracture and fibromyalgia   Endocrine:  + diabetes  Psychiatric: +anxiety  Integumentary:  No skin rash or sensitivity to the sun. Neurologic:  +frequent headaches  Heme-Lymphatic:  No history of anemia, no unexplained lumps or bumps      Objective:     Physical Exam:  Visit Vitals  BP (!) 125/94   Pulse 82   Temp 97.2 °F (36.2 °C)   Resp 14   Ht 5' 4\" (1.626 m)   Wt 60.5 kg (133 lb 6.1 oz)   SpO2 99%   BMI 22.89 kg/m²        Gen: pleasant white female, deaf, resting in nad  Skin:  Extremities and face reveal no rashes. HEENT: Sclerae anicteric. Deaf. Uses sign language and an interpretor to communicate  Cardiovascular: Regular rate and rhythm. No murmurs, gallops, or rubs. Respiratory:  Comfortable breathing with no accessory muscle use. Clear breath sounds with no wheezes, rales, or rhonchi. GI:  Abdomen nondistended, soft, and mildly tender in luq and llq without guarding or rebounding. Normal active bowel sounds. No enlargement of the liver or spleen. No masses palpable. Rectal:  Deferred  Musculoskeletal:  No pitting edema of the lower legs. Neurological:  Gross memory appears intact. Patient is alert and oriented. Psychiatric:  Mood appears appropriate with judgement intact. Lymphatic:  No cervical or supraclavicular adenopathy.     Lab/Data Review:  Lab Results   Component Value Date/Time    WBC 8.1 06/24/2022 04:13 AM    HGB 10.9 (L) 06/24/2022 04:13 AM    HCT 34.0 (L) 06/24/2022 04:13 AM    PLATELET 976 70/81/6736 04:13 AM    MCV 94.7 06/24/2022 04:13 AM     Lab Results   Component Value Date/Time    Sodium 137 06/24/2022 04:13 AM    Potassium 4.4 06/24/2022 04:13 AM    Chloride 108 06/24/2022 04:13 AM    CO2 22 06/24/2022 04:13 AM    Anion gap 7 06/24/2022 04:13 AM    Glucose 117 (H) 06/24/2022 04:13 AM    BUN 19 06/24/2022 04:13 AM    Creatinine 1.10 (H) 06/24/2022 04:13 AM    BUN/Creatinine ratio 17 06/24/2022 04:13 AM    GFR est AA >60 06/24/2022 04:13 AM    GFR est non-AA 55 (L) 06/24/2022 04:13 AM    Calcium 8.1 (L) 06/24/2022 04:13 AM    Bilirubin, total 0.3 06/24/2022 04:13 AM    Alk. phosphatase 76 06/24/2022 04:13 AM    Protein, total 6.7 06/24/2022 04:13 AM    Albumin 3.4 (L) 06/24/2022 04:13 AM    Globulin 3.3 06/24/2022 04:13 AM    A-G Ratio 1.0 (L) 06/24/2022 04:13 AM    ALT (SGPT) 93 (H) 06/24/2022 04:13 AM    AST (SGOT) 74 (H) 06/24/2022 04:13 AM     CT Results (most recent):  Results from Hospital Encounter encounter on 06/23/22    CT ABD PELV W CONT    Narrative  INDICATION: N/V and abd pain    EXAM: CT Abdomen and Pelvis is performed with 100 mL Isovue 370 contrast IV. Oral contrast was used for optimal bowel opacification. CT dose reduction was  achieved through use of a standardized protocol tailored for this examination  and automatic exposure control for dose modulation. FINDINGS:  There is no inflammation, ascites, pneumoperitoneum or significant adenopathy. Liver shows no significant finding. Bile ducts are not enlarged. Pancreas shows  no mass or inflammation. Spleen is unremarkable. Adrenal glands are normal in  size. Kidneys show no mass or hydronephrosis. Aorta is without aneurysm. The appendix is normal. Bowels are not dilated. The bladder is unremarkable. The  distal ureters are not dilated. There is no apparent pelvic mass. Impression  No Acute Disease. Assessment/Plan:     Nausea, vomiting  Habitual marijuana use  Cannabis hyperemesis syndrome  Anxiety  DM  NSAID use     43 y/o deaf female with type 1 DM presents with N/V x 8 days. She reports smoking marijuana multiple times per day to control her anxiety.   She has felt better the past 2 days, is no longer vomiting and wants to eat. She also uses nsaids daily but denies melena and has no signs of GIB. She has been getting opioids here despite having a reported history of gastroparesis. I feel her sxs are most consistent with cannabis hyperemesis syndrome and discussed this with the patient. I advised her not to use any more marijuana to prevent the returns of sxs. She should continue antiemetics as needed and advance diet as tolerated. Consider psych consult and medications to treat her anxiety. Avoid opioids and nsaids. Consider outpatient EGD and GES. Per chart review, she already has an appointment with [address] [place] [address].   We will sign off and see again on request.      JAMIR Crenshaw  06/24/22  5:39 PM

## 2022-06-24 NOTE — PROGRESS NOTES
Received notification from bedside RN about patient with regards to: requesting Ativan for anxiety issues.  Has been refusing treatment and imaging ordered  VS: /92, , RR 13, O2 sat 100% on RA    Intervention given: Ativan 1 mg PO x 1 dose ordered    0208: Requesting pain medication for abdominal pain  VS: /92, HR 93, RR 16, o2 sat 99% on RA    - Dilaudid 0.5 mg IV x 1 dose ordered    0550: Requesting Ativan again for anxiety  VS: /108, , RR 21, O2 sat 100% on RA    - Atarax 25 mg PO x 1 dose ordered

## 2022-06-24 NOTE — ED NOTES
Pt family member remains asleep in bed while pt is sitting in chair sleeping at this time, pt still refuses to get in bed

## 2022-06-24 NOTE — ROUTINE PROCESS
TRANSFER - IN REPORT:    Verbal report received from ALYSE Yen(name) on Lawerence Harness  being received from ED(unit) for routine progression of care      Report consisted of patients Situation, Background, Assessment and   Recommendations(SBAR). Information from the following report(s) SBAR, Kardex, Intake/Output, MAR and Accordion was reviewed with the receiving nurse. Opportunity for questions and clarification was provided.

## 2022-06-24 NOTE — ED NOTES
Pt refusing antibiotic administration at this time, tech in room to perform EKG, pt refusing EKG at this time as well.  Pt MD informed

## 2022-06-24 NOTE — ED NOTES
Pt given 2 cups of ice per pt family member that resides in pts bed at this time.  Pt still tolerating PO fluids w/o issue

## 2022-06-24 NOTE — PROGRESS NOTES
Pt and her sister are refusing to allow us to check her blood sugar or dose with our sliding scale. Attempted to educate pt and her sister however her sister got very agitated and screamed \"NO. End of story\" [facility]. Pt is refusing antibiotics, iv fluids.   Also refusing to wear tele

## 2022-06-25 VITALS
HEART RATE: 91 BPM | WEIGHT: 133.38 LBS | RESPIRATION RATE: 18 BRPM | HEIGHT: 64 IN | BODY MASS INDEX: 22.77 KG/M2 | DIASTOLIC BLOOD PRESSURE: 79 MMHG | SYSTOLIC BLOOD PRESSURE: 122 MMHG | OXYGEN SATURATION: 100 % | TEMPERATURE: 97.9 F

## 2022-06-25 LAB
ALBUMIN SERPL-MCNC: 2.6 G/DL (ref 3.5–5)
ALBUMIN/GLOB SERPL: 1 {RATIO} (ref 1.1–2.2)
ALP SERPL-CCNC: 63 U/L (ref 45–117)
ALT SERPL-CCNC: 65 U/L (ref 12–78)
ANION GAP SERPL CALC-SCNC: 4 MMOL/L (ref 5–15)
AST SERPL-CCNC: 36 U/L (ref 15–37)
BASOPHILS # BLD: 0 K/UL (ref 0–0.1)
BASOPHILS NFR BLD: 1 % (ref 0–1)
BILIRUB SERPL-MCNC: 0.2 MG/DL (ref 0.2–1)
BUN SERPL-MCNC: 12 MG/DL (ref 6–20)
BUN/CREAT SERPL: 14 (ref 12–20)
CALCIUM SERPL-MCNC: 8.2 MG/DL (ref 8.5–10.1)
CHLORIDE SERPL-SCNC: 112 MMOL/L (ref 97–108)
CO2 SERPL-SCNC: 28 MMOL/L (ref 21–32)
CREAT SERPL-MCNC: 0.85 MG/DL (ref 0.55–1.02)
DIFFERENTIAL METHOD BLD: ABNORMAL
EOSINOPHIL # BLD: 0.4 K/UL (ref 0–0.4)
EOSINOPHIL NFR BLD: 6 % (ref 0–7)
ERYTHROCYTE [DISTWIDTH] IN BLOOD BY AUTOMATED COUNT: 15.8 % (ref 11.5–14.5)
GLOBULIN SER CALC-MCNC: 2.7 G/DL (ref 2–4)
GLUCOSE SERPL-MCNC: 154 MG/DL (ref 65–100)
HCT VFR BLD AUTO: 32.6 % (ref 35–47)
HGB BLD-MCNC: 10.4 G/DL (ref 11.5–16)
IMM GRANULOCYTES # BLD AUTO: 0 K/UL (ref 0–0.04)
IMM GRANULOCYTES NFR BLD AUTO: 0 % (ref 0–0.5)
LYMPHOCYTES # BLD: 2.6 K/UL (ref 0.8–3.5)
LYMPHOCYTES NFR BLD: 37 % (ref 12–49)
MAGNESIUM SERPL-MCNC: 2.4 MG/DL (ref 1.6–2.4)
MCH RBC QN AUTO: 30.1 PG (ref 26–34)
MCHC RBC AUTO-ENTMCNC: 31.9 G/DL (ref 30–36.5)
MCV RBC AUTO: 94.2 FL (ref 80–99)
MONOCYTES # BLD: 0.5 K/UL (ref 0–1)
MONOCYTES NFR BLD: 7 % (ref 5–13)
NEUTS SEG # BLD: 3.4 K/UL (ref 1.8–8)
NEUTS SEG NFR BLD: 49 % (ref 32–75)
NRBC # BLD: 0 K/UL (ref 0–0.01)
NRBC BLD-RTO: 0 PER 100 WBC
PLATELET # BLD AUTO: 292 K/UL (ref 150–400)
PMV BLD AUTO: 10.3 FL (ref 8.9–12.9)
POTASSIUM SERPL-SCNC: 3.2 MMOL/L (ref 3.5–5.1)
PROT SERPL-MCNC: 5.3 G/DL (ref 6.4–8.2)
RBC # BLD AUTO: 3.46 M/UL (ref 3.8–5.2)
SODIUM SERPL-SCNC: 144 MMOL/L (ref 136–145)
WBC # BLD AUTO: 6.9 K/UL (ref 3.6–11)

## 2022-06-25 PROCEDURE — 74011000250 HC RX REV CODE- 250: Performed by: HOSPITALIST

## 2022-06-25 PROCEDURE — 83735 ASSAY OF MAGNESIUM: CPT

## 2022-06-25 PROCEDURE — 74011250637 HC RX REV CODE- 250/637: Performed by: GENERAL ACUTE CARE HOSPITAL

## 2022-06-25 PROCEDURE — 36415 COLL VENOUS BLD VENIPUNCTURE: CPT

## 2022-06-25 PROCEDURE — 74011250637 HC RX REV CODE- 250/637: Performed by: HOSPITALIST

## 2022-06-25 PROCEDURE — 74011250636 HC RX REV CODE- 250/636: Performed by: HOSPITALIST

## 2022-06-25 PROCEDURE — 80053 COMPREHEN METABOLIC PANEL: CPT

## 2022-06-25 PROCEDURE — 74011250636 HC RX REV CODE- 250/636: Performed by: GENERAL ACUTE CARE HOSPITAL

## 2022-06-25 PROCEDURE — C9113 INJ PANTOPRAZOLE SODIUM, VIA: HCPCS | Performed by: HOSPITALIST

## 2022-06-25 PROCEDURE — 85025 COMPLETE CBC W/AUTO DIFF WBC: CPT

## 2022-06-25 RX ORDER — METOCLOPRAMIDE 5 MG/1
5 TABLET ORAL
Qty: 30 TABLET | Refills: 0 | Status: SHIPPED | OUTPATIENT
Start: 2022-06-25 | End: 2022-07-05

## 2022-06-25 RX ORDER — METOCLOPRAMIDE HYDROCHLORIDE 5 MG/ML
5 INJECTION INTRAMUSCULAR; INTRAVENOUS EVERY 6 HOURS
Status: DISCONTINUED | OUTPATIENT
Start: 2022-06-25 | End: 2022-06-25 | Stop reason: HOSPADM

## 2022-06-25 RX ORDER — POTASSIUM CHLORIDE 20 MEQ/1
40 TABLET, EXTENDED RELEASE ORAL 3 TIMES DAILY
Status: DISCONTINUED | OUTPATIENT
Start: 2022-06-25 | End: 2022-06-25 | Stop reason: HOSPADM

## 2022-06-25 RX ORDER — OXYCODONE AND ACETAMINOPHEN 5; 325 MG/1; MG/1
1 TABLET ORAL
Qty: 4 TABLET | Refills: 0 | Status: SHIPPED | OUTPATIENT
Start: 2022-06-25 | End: 2022-06-29

## 2022-06-25 RX ORDER — PANTOPRAZOLE SODIUM 40 MG/1
40 TABLET, DELAYED RELEASE ORAL DAILY
Qty: 30 TABLET | Refills: 0 | Status: SHIPPED | OUTPATIENT
Start: 2022-06-25 | End: 2022-07-25

## 2022-06-25 RX ADMIN — ENOXAPARIN SODIUM 40 MG: 100 INJECTION SUBCUTANEOUS at 08:33

## 2022-06-25 RX ADMIN — SODIUM CHLORIDE, PRESERVATIVE FREE 10 ML: 5 INJECTION INTRAVENOUS at 13:27

## 2022-06-25 RX ADMIN — ACETAMINOPHEN 325MG 650 MG: 325 TABLET ORAL at 14:49

## 2022-06-25 RX ADMIN — POTASSIUM CHLORIDE AND SODIUM CHLORIDE: 900; 300 INJECTION, SOLUTION INTRAVENOUS at 08:24

## 2022-06-25 RX ADMIN — METOCLOPRAMIDE 5 MG: 5 INJECTION, SOLUTION INTRAMUSCULAR; INTRAVENOUS at 05:49

## 2022-06-25 RX ADMIN — MORPHINE SULFATE 1 MG: 2 INJECTION, SOLUTION INTRAMUSCULAR; INTRAVENOUS at 08:33

## 2022-06-25 RX ADMIN — LORAZEPAM 1 MG: 1 TABLET ORAL at 13:29

## 2022-06-25 RX ADMIN — METOCLOPRAMIDE 5 MG: 5 INJECTION, SOLUTION INTRAMUSCULAR; INTRAVENOUS at 11:54

## 2022-06-25 RX ADMIN — SODIUM CHLORIDE 40 MG: 9 INJECTION, SOLUTION INTRAMUSCULAR; INTRAVENOUS; SUBCUTANEOUS at 08:33

## 2022-06-25 RX ADMIN — MORPHINE SULFATE 1 MG: 2 INJECTION, SOLUTION INTRAMUSCULAR; INTRAVENOUS at 03:44

## 2022-06-25 NOTE — PROGRESS NOTES
Hospitalist Progress Note    NAME: Nancy Godinez   :  1985   MRN:  [id number]       Assessment / Plan:    75-year-old female presenting  again to our ED with abdominal pain nausea and vomiting.  Has known history of type 1 diabetes, states symptoms feel similar to priorexacerbations of gastroparesis. This is the patient's Seventh  ER visit since  for the same complaint. Of note pt was in pain contract in [place] but moved here without pcp. she is a difficult stick. Pt. is on insulin pump      Recurrent Intractable N/V associated with diffuse Abd pain  Like gastroparesis exacerbation  severe hypokalemia 2.6  Likely 2/2 Vomitting  IDDM  Pt. is on insulin pump  Acute Kidney Injury 1.6 Likely prerenal   Utox positive for  THC Barbiturates  And opiates  Of note pt was in pain contract in [place] but moved here without pcp.    7th ER visits in 6 days  Leukocytosis  13.4  UA  Positive yeast  Small LE 2plus bacteria  Patient is deaf.      CLD and advance as tolrated  IV PPI  F/U CT abd pending  GI Consulted  K  repleted IV  And Will  Monitor  SSI    Check A1C  Will rx with ceftriaxone and Fluconazole for now   Will f/u Cx  IVF  reglan  PPI   Cont. ssi   Check A1C  Check ekg for QTc as pt. Will need Multiple doses of antiemetics  Pti s using reglan and zofran at home               Code Status: full  Surrogate Decision Maker:  . Desiree Duff Sister         Romulo Barone [phone]         DVT Prophylaxis: sq lovenox  GI Prophylaxis: not indicated     Baseline: independent    Recommended Disposition: Home w/Family  Anticipated Discharge Date:  >48 hours        Subjective:     Discussed with RN events overnight.    Pt prefers to have her sister to help w sign language  C/o abd pain, left side  Dark green BM  Vomiting  Anxiety     Review of Systems:  Symptom Y/N Comments  Symptom Y/N Comments Fever/Chills    Chest Pain     Poor Appetite    Edema     Cough    Abdominal Pain     Sputum    Joint Pain     SOB/MONROY    Pruritis/Rash     Nausea/vomit    Tolerating PT/OT     Diarrhea    Tolerating Diet     Constipation    Other       PO intake: No data found. Wt Readings from Last 10 Encounters:   06/23/22 60.5 kg (133 lb 6.1 oz)   06/22/22 61.7 kg (136 lb)   06/21/22 61.7 kg (136 lb)   05/24/13 69.9 kg (154 lb)       Objective:     VITALS:   Last 24hrs VS reviewed since prior progress note. Most recent are:  Patient Vitals for the past 24 hrs:   Temp Pulse Resp BP SpO2   06/24/22 2017 97.8 °F (36.6 °C) 99 18 132/87 100 %   06/24/22 1506 97.8 °F (36.6 °C) 99 18 (!) 146/99 99 %   06/24/22 1348 97.2 °F (36.2 °C) 82 14 (!) 125/94 99 %   06/24/22 0751 97.8 °F (36.6 °C) (!) 107 17 134/89 99 %   06/24/22 0717 97.9 °F (36.6 °C) -- -- -- --   06/24/22 0637 -- 79 20 (!) 137/90 98 %   06/24/22 0619 -- (!) 102 14 -- 100 %   06/24/22 0525 -- (!) 103 21 (!) 169/108 100 %   06/24/22 0451 -- 87 20 -- 100 %   06/24/22 0220 -- (!) 108 21 (!) 160/98 100 %     No intake or output data in the 24 hours ending 06/25/22 0201     I had a face to face encounter, and independently examined this patient as outlined below:    PHYSICAL EXAM:  General:    No distress     HEENT: Atraumatic, anicteric sclerae, pink conjunctivae, MMM  Neck:  Supple, symmetrical  Lungs:   CTA. No Wheezing/Rhonchi. No rales. No tenderness. No Accessory muscle use. Heart:   Regular rhythm. No murmur. No JVD   GI/:   Soft. ND. BS normal. Mild left side abd tenderness   Extremities: No edema. No cyanosis. No clubbing. Skin:     Not pale. Not Jaundiced. No rashes   Psych:  Good insight. Not depressed. Not anxious or agitated. Neurologic: Alert and oriented X 4. EOMs intact. No facial asymmetry. No slurred speech. Symmetrical strength, Sensation grossly intact. Labs     I reviewed today's most current labs and imaging studies.   Pertinent labs include:  Recent Labs     06/24/22  0413 06/23/22  1437   WBC 8.1 13.4*   HGB 10.9* 13.3   HCT 34.0* 39.9    467*     Recent Labs     06/24/22  0413 06/23/22  1437    137   K 4.4 2.6*    104   CO2 22 21   * 143*   BUN 19 30*   CREA 1.10* 1.60*   CA 8.1* 9.0   MG 2.5* 2.8*   ALB 3.4* 4.2   TBILI 0.3 0.3   ALT 93* 116*     CT ABD PELV W CONT    Result Date: 6/24/2022  No Acute Disease. CT ABD PELV W CONT    Result Date: 6/24/2022  No Acute Disease.          All Micro Results     None            Current Medications:     Current Facility-Administered Medications:     LORazepam (ATIVAN) tablet 1 mg, 1 mg, Oral, Q8H PRN, Radha Damon MD, 1 mg at 06/24/22 1835    sodium chloride (NS) flush 5-40 mL, 5-40 mL, IntraVENous, Q8H, Soto Fernandez MD, 10 mL at 06/24/22 2131    sodium chloride (NS) flush 5-40 mL, 5-40 mL, IntraVENous, PRN, Dasha Fernandez MD    acetaminophen (TYLENOL) tablet 650 mg, 650 mg, Oral, Q6H PRN **OR** acetaminophen (TYLENOL) suppository 650 mg, 650 mg, Rectal, Q6H PRN, Dasha Fernandez MD    polyethylene glycol (MIRALAX) packet 17 g, 17 g, Oral, DAILY PRN, Dasha Fernandez MD    ondansetron (ZOFRAN ODT) tablet 4 mg, 4 mg, Oral, Q8H PRN **OR** ondansetron (ZOFRAN) injection 4 mg, 4 mg, IntraVENous, Q6H PRN, Soto Fernandez MD, 4 mg at 06/24/22 2227    enoxaparin (LOVENOX) injection 40 mg, 40 mg, SubCUTAneous, DAILY, Dasha Fernandez MD    cefTRIAXone (ROCEPHIN) 1 g in 0.9% sodium chloride (MBP/ADV) 50 mL MBP, 1 g, IntraVENous, Q24H, Soto Fernandez MD, Last Rate: 100 mL/hr at 06/24/22 2125, 1 g at 06/24/22 2125    fluconazole (DIFLUCAN) 200mg/100 mL IVPB (premix), 200 mg, IntraVENous, Q24H, Soto Fernandez MD, Last Rate: 100 mL/hr at 06/24/22 2227, 200 mg at 06/24/22 2227    pantoprazole (PROTONIX) 40 mg in 0.9% sodium chloride 10 mL injection, 40 mg, IntraVENous, DAILY, Soto Fernandez MD, 40 mg at 06/24/22 0802    0.9% sodium chloride with KCl 40 mEq/L infusion, , IntraVENous, CONTINUOUS, DinLiat MD, Last Rate: 100 mL/hr at 06/24/22 2328, New Bag at 06/24/22 2328    morphine injection 1 mg, 1 mg, IntraVENous, Q3H PRN, Jim, Soto COSTELLO MD, 1 mg at 06/24/22 2129    insulin lispro (HUMALOG) injection, , SubCUTAneous, AC&HS, DinLiat MD    glucose chewable tablet 16 g, 4 Tablet, Oral, PRN, DinLiat MD    glucagon (GLUCAGEN) injection 1 mg, 1 mg, IntraMUSCular, PRN, Soto Fernandez MD    dextrose 10% infusion 0-250 mL, 0-250 mL, IntraVENous, PRN, DinLiat MD    nicotine (NICODERM CQ) 21 mg/24 hr patch 1 Patch, 1 Patch, TransDERmal, Q24H, Anh Ureña NP, 1 Patch at 06/24/22 2130     Procedures: see electronic medical records for all procedures/Xrays and details which were not copied into this note but were reviewed prior to creation of Plan. Reviewed most current lab test results and cultures  YES  Reviewed most current radiology test results   YES  Review and summation of old records today    NO  Reviewed patient's current orders and MAR    YES  PMH/SH reviewed - no change compared to H&P  ________________________________________________________________________  Care Plan discussed with:    Comments   Patient x    Family  x    RN     Care Manager     Consultant                        Multidiciplinary team rounds were held today with , nursing, pharmacist and clinical coordinator. Patient's plan of care was discussed; medications were reviewed and discharge planning was addressed.      ________________________________________________________________________  Total NON critical care TIME:  33   Minutes    Total CRITICAL CARE TIME Spent:   Minutes non procedure based      Comments   >50% of visit spent in counseling and coordination of care x     This includes time during multidisciplinary rounds if indicated above   ________________________________________________________________________  Shon Moreira MD

## 2022-06-25 NOTE — DISCHARGE INSTRUCTIONS
Patient Education        Gastroparesis: Care Instructions  Overview     When you have gastroparesis, your stomach takes a lot longer to empty. This delay can cause belly pain, bloating, and belching. It also can cause hiccups, heartburn, nausea or vomiting. You may not feel like eating. These symptoms may come and go. They most often occur during and after meals. You may feel full after only a few bites of food. This condition occurs when the nerves or muscles to the stomach don't work properly. Diabetes is one of the most common causes of this nerve damage. Gastroparesis can make it harder to control your blood sugar levels. But keeping your blood sugar levels under control may help with your symptoms. Parkinson's disease, stroke, and some medicines can also cause this condition. Home treatment can often help. Follow-up care is a key part of your treatment and safety. Be sure to make and go to all appointments, and call your doctor if you are having problems. It's also a good idea to know your test results and keep a list of the medicines you take. How can you care for yourself at home? · Eat several small meals each day rather than three large meals. · Eat foods that are low in fiber and fat. · If your doctor suggests it, take medicines that help the stomach empty more quickly. These are called motility agents. When should you call for help? Call your doctor now or seek immediate medical care if:    · You are vomiting.     · You have new or worse belly pain.     · You have a fever.     · You cannot pass stools or gas. Watch closely for changes in your health, and be sure to contact your doctor if you have any problems. Where can you learn more? Go to http://www.gray.com/  Enter M106 in the search box to learn more about \"Gastroparesis: Care Instructions. \"  Current as of: September 8, 2021               Content Version: 13.2  © 5874-8842 Healthwise, [facility].    Care instructions adapted under license by [employer] (which disclaims liability or warranty for this information). If you have questions about a medical condition or this instruction, always ask your healthcare professional. [address] any warranty or liability for your use of this information.

## 2022-06-25 NOTE — PROGRESS NOTES
End of Shift Note    Bedside shift change report given to Gabriella Franco  (oncoming nurse) by Igor Youssef RN (offgoing nurse). Report included the following information SBAR, Kardex and MAR    Shift worked:  7p-7a     Shift summary and any significant changes:     Plan of care reviewed with patient.  POC included;  IVF  IV ABT  Pain mgmt with Morphinex2  Zofran given once/Reblan scheduled Q6  Full Liquid diet  Up Ad Krystle  Room Air  Sign language interpretor [id number] utilized for the initial shift assessment  Safety measures in place     Concerns for physician to address:      Zone phone for oncoming shift:  [phone]

## 2022-06-25 NOTE — PROGRESS NOTES
Pt is cleared for d/c from a CM standpoint. CM acknowledged d/c order. CM met with pt and sister at bedside who provided sign language to pt. CM attempted to complete initial assessment questions; however, sister stated the questions were silly and she declined to answer. CM reviewed 2IM ltr with pt and sister and pt signed. CM placed on bedside chart. Sister will transport pt home.       Yadira Smith, MSW  Care Management, [place] [address]

## 2022-06-25 NOTE — DISCHARGE SUMMARY
Hospitalist Discharge Summary     Patient ID:  Jose Alejandro Ivy  [id number]  24 y.o.  1985 6/23/2022    PCP on record: None    Admit date: 6/23/2022  Discharge date and time: 6/25/2022    DISCHARGE DIAGNOSIS:  As below     CONSULTATIONS:  IP CONSULT TO GASTROENTEROLOGY    Excerpted HPI from H&P of Bonny Bassett MD:  CHIEF COMPLAINT:  Recurrent  intracatble N/V/Abd pain  Patient arrives with complaint of abdominal pain for the past 6 days. Patient reports vomiting and has history of gastroparesis. She states she cannot eat or drink x 6 days.  multiple episodes of vomiting today. She was seen here 6 times before for the same in our ED     HISTORY OF PRESENT ILLNESS:     Jose Alejandro Ivy is a 42 y.o.  female who presents with  recurrent N/V and  abd pain .   This is the patient's Seventh  ER visit since June 17 for the same complaint. Rock Day was seen in our ER last night, and signed out [address] Rock Day has had constant abdominal pain which is primarily on the left abdomen.  She denies back pain or chest pain.  She is vomiting multiple times a day. Rock Day says she can't eat or drink. Rock Day denies fever or chills.  She denies cough, dysuria or change in bowel habits.  Her pain is currently a 10 out of 10 in severity.  She has not had any imaging of her abdomen on any of her prior ER visits. she is a difficult stick. Patient is deaf. Shawn Horne states that for the past 6 days. she has had diffuse abdominal pain, has a history of gastroparesis and states this feels like her gastroparesis   ____________________________________________________________________  DISCHARGE SUMMARY/HOSPITAL COURSE:  for full details see H&P, daily progress notes, labs, consult notes. 73-year-old female presenting  again to our ED with abdominal pain nausea and vomiting.  Has known history of type 1 diabetes, states symptoms feel similar to priorexacerbations of gastroparesis. This is the patient's Seventh  ER visit since June 17 for the same complaint. Of note pt was in pain contract in [place] but moved here without pcp. she is a difficult stick. Pt. is on insulin pump      Recurrent Intractable N/V associated with diffuse Abd pain  Like gastroparesis exacerbation  severe hypokalemia 2.6  Likely 2/2 Vomitting  IDDM  Pt. is on insulin pump  Acute Kidney Injury 1.6 Likely prerenal   Patient is deaf.   Utox positive for [facility] Barbiturates  And opiates  Of note pt was in pain contract in [place] but moved here without pcp.  does not have pain meds any more. 7th ER visits in 6 days  Leukocytosis  13.4  UA  Positive yeast  Small LE 2plus bacteria. Asymptomatic so will DC ABx  CLD advanced to regular and tolerated well with no issues   Start on reglan and PPI as per GI  CT abd neg for acute pathology   K  repleted    IVF given   Stable for DC  Pt stated that she has an appointment w Pain clinic on this coming Tuesday 6/28  _______________________________________________________________________  Patient seen and examined by me on discharge day. Pertinent Findings:  Gen:    Not in distress  Chest: Clear lungs  CVS:   Regular rhythm. No edema  Abd:  Soft, not distended, not tender  Neuro:  Alert, oriented   _______________________________________________________________________  DISCHARGE MEDICATIONS:   Current Discharge Medication List      START taking these medications    Details   metoclopramide HCl (Reglan) 5 mg tablet Take 1 Tablet by mouth Before breakfast, lunch, and dinner for 10 days. Qty: 30 Tablet, Refills: 0  Start date: 6/25/2022, End date: 7/5/2022      pantoprazole (Protonix) 40 mg tablet Take 1 Tablet by mouth daily for 30 days. Qty: 30 Tablet, Refills: 0  Start date: 6/25/2022, End date: 7/25/2022      oxyCODONE-acetaminophen (PERCOCET) 5-325 mg per tablet Take 1 Tablet by mouth every six (6) hours as needed for Pain for up to 4 days. Max Daily Amount: 4 Tablets.   Qty: 4 Tablet, Refills: 0  Start date: 6/25/2022, End date: 6/29/2022    Associated Diagnoses: Right leg pain         CONTINUE these medications which have NOT CHANGED    Details   dicyclomine (BENTYL) 20 mg tablet Take  by mouth. insulin aspart U-100 (NOVOLOG) 100 unit/mL injection by SubCUTAneous route. BUSPIRONE HCL (BUSPAR PO) Take  by mouth. Patient Follow Up Instructions: Activity: Activity as tolerated  Diet: ADULT DIET Full Liquid  Wound Care: None needed  Follow-up with PCP in  1 week.   Follow-up tests/labs none       Follow-up Information     Follow up With Specialties Details Why Contact Info    None    None ([phone]) Patient stated that they have no PCP          ________________________________________________________________    Risk of deterioration: High    Condition at Discharge:  Stable  __________________________________________________________________    Disposition  Home with family, no needs    ____________________________________________________________________    Code Status: Full Code  ___________________________________________________________________      Total time in minutes spent coordinating this discharge (includes going over instructions, follow-up, prescriptions, and preparing report for sign off to her PCP) :  >30 minutes    Signed:  Jered Cottrell MD

## 2022-06-30 NOTE — PROGRESS NOTES
Physician Progress Note      PATIENT:               Panfilo Canas  CSN #:                  [id number]  :                       1985  ADMIT DATE:       2022 4:11 PM  [address] [place] DATE:        2022 5:22 PM  RESPONDING  PROVIDER #:        Christian Partida MD          QUERY TEXT:    Dr Eda Gerardo  Pt admitted with Gastroparesis exacerbation. Pt noted to have marijuana hyperemesis syndrome. If possible, please document in progress notes and discharge summary if you are evaluating and /or treating any of the following: The medical record reflects the following:  Risk Factors: presents with recurrent N/V with diffuse abdominal pain; DM  Clinical Indicators:  GI MD: ongoing ER visits for N/V that is refractory likely d/t Marijuana Hyperemesis Syndrome w/ possible diabetic Gastroparesis ;  GI NP: symptoms are most c/w cannabis hyperemesis syndrome  Treatment: 1LNS & 1LLR bolus, IV MSO4, PO KCl, IM Ativan/Haldol, IV Zofran,  protonix,  dilaudid; replete K+ IV, Ceftriaxone/Fluconazole, GI consult  Options provided:  -- Nausea and Vomiting due to Marijuana Hyperemesis Syndrome and Diabetic Gastroparesis  -- Nausea and Vomiting due to Marijuana Hyperemesis Syndrome  -- Other - I will add my own diagnosis  -- Disagree - Not applicable / Not valid  -- Disagree - Clinically unable to determine / Unknown  -- Refer to Clinical Documentation Reviewer    PROVIDER RESPONSE TEXT:    This patient has nausea and vomiting due to diabetic Gastroparesis. Query created by:  Solange Bansal on 2022 2:02 PM      Electronically signed by:  Christian Partida MD 2022 3:46 PM

## 2022-07-05 ENCOUNTER — HOSPITAL ENCOUNTER (EMERGENCY)
Age: 37
Discharge: HOME | End: 2022-07-06
Attending: EMERGENCY MEDICINE | Admitting: EMERGENCY MEDICINE
Payer: MEDICARE

## 2022-07-05 DIAGNOSIS — R10.13 ABDOMINAL PAIN, EPIGASTRIC: ICD-10-CM

## 2022-07-05 DIAGNOSIS — R11.2 NAUSEA AND VOMITING, UNSPECIFIED VOMITING TYPE: Primary | ICD-10-CM

## 2022-07-05 DIAGNOSIS — E87.6 HYPOKALEMIA: ICD-10-CM

## 2022-07-05 LAB
APPEARANCE UR: CLEAR
BACTERIA URNS QL MICRO: NEGATIVE /HPF
BILIRUB UR QL: NEGATIVE
COLOR UR: ABNORMAL
EPITH CASTS URNS QL MICRO: ABNORMAL /LPF
ERYTHROCYTE [DISTWIDTH] IN BLOOD BY AUTOMATED COUNT: 15.1 % (ref 11.5–14.5)
GLUCOSE UR STRIP.AUTO-MCNC: 500 MG/DL
HCT VFR BLD AUTO: 34.6 % (ref 35–47)
HGB BLD-MCNC: 11.2 G/DL (ref 11.5–16)
HGB UR QL STRIP: NEGATIVE
KETONES UR QL STRIP.AUTO: ABNORMAL MG/DL
LEUKOCYTE ESTERASE UR QL STRIP.AUTO: NEGATIVE
MCH RBC QN AUTO: 29.9 PG (ref 26–34)
MCHC RBC AUTO-ENTMCNC: 32.4 G/DL (ref 30–36.5)
MCV RBC AUTO: 92.3 FL (ref 80–99)
NITRITE UR QL STRIP.AUTO: NEGATIVE
NRBC # BLD: 0 K/UL (ref 0–0.01)
NRBC BLD-RTO: 0 PER 100 WBC
PH UR STRIP: 5 [PH] (ref 5–8)
PLATELET # BLD AUTO: 320 K/UL (ref 150–400)
PMV BLD AUTO: 9.7 FL (ref 8.9–12.9)
PROT UR STRIP-MCNC: 30 MG/DL
RBC # BLD AUTO: 3.75 M/UL (ref 3.8–5.2)
RBC #/AREA URNS HPF: ABNORMAL /HPF (ref 0–5)
SP GR UR REFRACTOMETRY: >1.03 (ref 1–1.03)
UROBILINOGEN UR QL STRIP.AUTO: 1 EU/DL (ref 0.2–1)
WBC # BLD AUTO: 7.6 K/UL (ref 3.6–11)
WBC URNS QL MICRO: ABNORMAL /HPF (ref 0–4)

## 2022-07-05 PROCEDURE — 96374 THER/PROPH/DIAG INJ IV PUSH: CPT

## 2022-07-05 PROCEDURE — 96375 TX/PRO/DX INJ NEW DRUG ADDON: CPT

## 2022-07-05 PROCEDURE — 36415 COLL VENOUS BLD VENIPUNCTURE: CPT

## 2022-07-05 PROCEDURE — 99284 EMERGENCY DEPT VISIT MOD MDM: CPT

## 2022-07-05 PROCEDURE — 96361 HYDRATE IV INFUSION ADD-ON: CPT

## 2022-07-05 PROCEDURE — 85027 COMPLETE CBC AUTOMATED: CPT

## 2022-07-05 PROCEDURE — 81001 URINALYSIS AUTO W/SCOPE: CPT

## 2022-07-06 VITALS
HEIGHT: 64 IN | RESPIRATION RATE: 22 BRPM | WEIGHT: 136 LBS | BODY MASS INDEX: 23.22 KG/M2 | TEMPERATURE: 98.5 F | DIASTOLIC BLOOD PRESSURE: 97 MMHG | HEART RATE: 106 BPM | SYSTOLIC BLOOD PRESSURE: 132 MMHG | OXYGEN SATURATION: 97 %

## 2022-07-06 LAB
ALBUMIN SERPL-MCNC: 3.7 G/DL (ref 3.5–5)
ALBUMIN/GLOB SERPL: 1.1 {RATIO} (ref 1.1–2.2)
ALP SERPL-CCNC: 86 U/L (ref 45–117)
ALT SERPL-CCNC: 29 U/L (ref 12–78)
ANION GAP SERPL CALC-SCNC: 11 MMOL/L (ref 5–15)
AST SERPL-CCNC: 20 U/L (ref 15–37)
BILIRUB SERPL-MCNC: 0.1 MG/DL (ref 0.2–1)
BUN SERPL-MCNC: 26 MG/DL (ref 6–20)
BUN/CREAT SERPL: 18 (ref 12–20)
CALCIUM SERPL-MCNC: 8.5 MG/DL (ref 8.5–10.1)
CHLORIDE SERPL-SCNC: 108 MMOL/L (ref 97–108)
CO2 SERPL-SCNC: 21 MMOL/L (ref 21–32)
CREAT SERPL-MCNC: 1.45 MG/DL (ref 0.55–1.02)
GLOBULIN SER CALC-MCNC: 3.4 G/DL (ref 2–4)
GLUCOSE SERPL-MCNC: 100 MG/DL (ref 65–100)
LIPASE SERPL-CCNC: 261 U/L (ref 73–393)
POTASSIUM SERPL-SCNC: 3.2 MMOL/L (ref 3.5–5.1)
PROT SERPL-MCNC: 7.1 G/DL (ref 6.4–8.2)
SODIUM SERPL-SCNC: 140 MMOL/L (ref 136–145)

## 2022-07-06 PROCEDURE — 74011250637 HC RX REV CODE- 250/637: Performed by: EMERGENCY MEDICINE

## 2022-07-06 PROCEDURE — 83690 ASSAY OF LIPASE: CPT

## 2022-07-06 PROCEDURE — 36415 COLL VENOUS BLD VENIPUNCTURE: CPT

## 2022-07-06 PROCEDURE — 80053 COMPREHEN METABOLIC PANEL: CPT

## 2022-07-06 PROCEDURE — 74011250636 HC RX REV CODE- 250/636: Performed by: EMERGENCY MEDICINE

## 2022-07-06 RX ORDER — POTASSIUM CHLORIDE 750 MG/1
40 TABLET, FILM COATED, EXTENDED RELEASE ORAL
Status: COMPLETED | OUTPATIENT
Start: 2022-07-06 | End: 2022-07-06

## 2022-07-06 RX ORDER — METOCLOPRAMIDE 10 MG/1
10 TABLET ORAL
Qty: 12 TABLET | Refills: 0 | Status: SHIPPED | OUTPATIENT
Start: 2022-07-06 | End: 2022-07-16

## 2022-07-06 RX ORDER — HYDROMORPHONE HYDROCHLORIDE 1 MG/ML
0.5 INJECTION, SOLUTION INTRAMUSCULAR; INTRAVENOUS; SUBCUTANEOUS
Status: COMPLETED | OUTPATIENT
Start: 2022-07-06 | End: 2022-07-06

## 2022-07-06 RX ORDER — METOCLOPRAMIDE HYDROCHLORIDE 5 MG/ML
10 INJECTION INTRAMUSCULAR; INTRAVENOUS
Status: COMPLETED | OUTPATIENT
Start: 2022-07-06 | End: 2022-07-06

## 2022-07-06 RX ADMIN — HYDROMORPHONE HYDROCHLORIDE 0.5 MG: 1 INJECTION, SOLUTION INTRAMUSCULAR; INTRAVENOUS; SUBCUTANEOUS at 00:30

## 2022-07-06 RX ADMIN — POTASSIUM CHLORIDE 40 MEQ: 750 TABLET, FILM COATED, EXTENDED RELEASE ORAL at 02:32

## 2022-07-06 RX ADMIN — METOCLOPRAMIDE 10 MG: 5 INJECTION, SOLUTION INTRAMUSCULAR; INTRAVENOUS at 00:28

## 2022-07-06 RX ADMIN — SODIUM CHLORIDE 1000 ML: 9 INJECTION, SOLUTION INTRAVENOUS at 00:22

## 2022-07-07 ENCOUNTER — HOSPITAL ENCOUNTER (EMERGENCY)
Age: 37
Discharge: STILL ADMITTED OR ADMINISTRATIVE | End: 2022-07-07

## 2022-07-07 NOTE — ED PROVIDER NOTES
EMERGENCY DEPARTMENT HISTORY AND PHYSICAL EXAM      Date: 7/5/2022  Patient Name: Dave Charles    History of Presenting Illness     Chief Complaint   Patient presents with    Abdominal Pain     Sx since yesterday. Hx of gastroparesis, and sx are consistent with her gastroparesis. pt is deaf. Hx of DM and has an insulin pump    Vomiting       History Provided By: Patient    HPI: Dave Charles, 42 y.o. female with PMHx significant for anxiety, fibromyalgia, insulin-dependent diabetes, deaf, gastroparesis presents to the ED with nausea, vomiting, abdominal pain. States her pain is worse when she lies down. She reports streaks of blood in her emesis. Denies any dysuria, hematuria, urinary frequency. Denies any fevers, chills, chest pain, cough, shortness of breath. States her blood sugar was 241 earlier but is down to 114. States that she has allergies to medications and request the medicine that she got last time (Dilaudid) to help with her symptoms. Javed Sterling PCP: None    No current facility-administered medications on file prior to encounter. Current Outpatient Medications on File Prior to Encounter   Medication Sig Dispense Refill    pantoprazole (Protonix) 40 mg tablet Take 1 Tablet by mouth daily for 30 days. 30 Tablet 0    dicyclomine (BENTYL) 20 mg tablet Take  by mouth.  insulin aspart U-100 (NOVOLOG) 100 unit/mL injection by SubCUTAneous route.  BUSPIRONE HCL (BUSPAR PO) Take  by mouth. Past History     Past Medical History:  Past Medical History:   Diagnosis Date    Anxiety     Deaf     Fibromyalgia     Musculoskeletal disorder     back problems    Pleurisy        Past Surgical History:  No past surgical history on file. Family History:  No family history on file.     Social History:  Social History     Tobacco Use    Smoking status: Current Every Day Smoker    Smokeless tobacco: Not on file   Substance Use Topics    Alcohol use: No    Drug use: Yes     Types: Marijuana       Allergies: Allergies   Allergen Reactions    Codeine Nausea and Vomiting    Erythromycin Nausea and Vomiting    Ibuprofen Nausea and Vomiting         Review of Systems   Review of Systems   Constitutional: Negative for chills and fever. HENT: Negative for congestion, rhinorrhea and sore throat. Respiratory: Negative for cough, chest tightness and shortness of breath. Cardiovascular: Negative for chest pain and palpitations. Gastrointestinal: Positive for abdominal pain, nausea and vomiting. Genitourinary: Negative for dysuria, flank pain and hematuria. Musculoskeletal: Negative for back pain, myalgias and neck pain. Skin: Negative for rash and wound. Neurological: Negative for dizziness, syncope, light-headedness and headaches. Psychiatric/Behavioral: Negative for confusion. The patient is nervous/anxious. All other systems reviewed and are negative.         Physical Exam   General appearance - well nourished, well appearing, and in no distress  Eyes - pupils equal and reactive, extraocular eye movements intact  ENT - mucous membranes moist, pharynx normal without lesions, deaf  Neck - supple, no significant adenopathy; non-tender to palpation  Chest - clear to auscultation, no wheezes, rales or rhonchi; non-tender to palpation  Heart - normal rate and regular rhythm, S1 and S2 normal, no murmurs noted  Abdomen - soft, generalized abdominal tenderness worst in epigastric area, nondistended, no masses or organomegaly  Musculoskeletal - no joint tenderness, deformity or swelling; normal ROM  Extremities - peripheral pulses normal, no pedal edema  Skin - normal coloration and turgor, no rashes  Neurological - alert, oriented x3, normal speech, no focal findings or movement disorder noted    Diagnostic Study Results     Labs -     Recent Results (from the past 48 hour(s))   CBC W/O DIFF    Collection Time: 07/05/22  8:03 PM   Result Value Ref Range    WBC 7.6 3.6 - 11.0 K/uL RBC 3.75 (L) 3.80 - 5.20 M/uL    HGB 11.2 (L) 11.5 - 16.0 g/dL    HCT 34.6 (L) 35.0 - 47.0 %    MCV 92.3 80.0 - 99.0 FL    MCH 29.9 26.0 - 34.0 PG    MCHC 32.4 30.0 - 36.5 g/dL    RDW 15.1 (H) 11.5 - 14.5 %    PLATELET 643 083 - 351 K/uL    MPV 9.7 8.9 - 12.9 FL    NRBC 0.0 0  WBC    ABSOLUTE NRBC 0.00 0.00 - 0.01 K/uL   URINALYSIS W/ RFLX MICROSCOPIC    Collection Time: 07/05/22  8:03 PM   Result Value Ref Range    Color DARK YELLOW      Appearance CLEAR CLEAR      Specific gravity >1.030 (H) 1.003 - 1.030    pH (UA) 5.0 5.0 - 8.0      Protein 30 (A) NEG mg/dL    Glucose 500 (A) NEG mg/dL    Ketone TRACE (A) NEG mg/dL    Bilirubin Negative NEG      Blood Negative NEG      Urobilinogen 1.0 0.2 - 1.0 EU/dL    Nitrites Negative NEG      Leukocyte Esterase Negative NEG      WBC 0-4 0 - 4 /hpf    RBC 0-5 0 - 5 /hpf    Epithelial cells FEW FEW /lpf    Bacteria Negative NEG /hpf   METABOLIC PANEL, COMPREHENSIVE    Collection Time: 07/06/22 12:59 AM   Result Value Ref Range    Sodium 140 136 - 145 mmol/L    Potassium 3.2 (L) 3.5 - 5.1 mmol/L    Chloride 108 97 - 108 mmol/L    CO2 21 21 - 32 mmol/L    Anion gap 11 5 - 15 mmol/L    Glucose 100 65 - 100 mg/dL    BUN 26 (H) 6 - 20 MG/DL    Creatinine 1.45 (H) 0.55 - 1.02 MG/DL    BUN/Creatinine ratio 18 12 - 20      GFR est AA 49 (L) >60 ml/min/1.73m2    GFR est non-AA 40 (L) >60 ml/min/1.73m2    Calcium 8.5 8.5 - 10.1 MG/DL    Bilirubin, total 0.1 (L) 0.2 - 1.0 MG/DL    ALT (SGPT) 29 12 - 78 U/L    AST (SGOT) 20 15 - 37 U/L    Alk.  phosphatase 86 45 - 117 U/L    Protein, total 7.1 6.4 - 8.2 g/dL    Albumin 3.7 3.5 - 5.0 g/dL    Globulin 3.4 2.0 - 4.0 g/dL    A-G Ratio 1.1 1.1 - 2.2     LIPASE    Collection Time: 07/06/22 12:59 AM   Result Value Ref Range    Lipase 261 73 - 393 U/L       Radiologic Studies -   No orders to display     CT Results  (Last 48 hours)    None        CXR Results  (Last 48 hours)    None            Medical Decision Making   I am the first provider for this patient. I reviewed the vital signs, available nursing notes, past medical history, past surgical history, family history and social history. Vital Signs-Reviewed the patient's vital signs. Records Reviewed: Nursing Notes and Old Medical Records    Provider Notes (Medical Decision Making):   Differential diagnosis: Gastroparesis, gastroenteritis, dehydration, electrolyte abnormality, UTI    ED Course:   Initial assessment performed. The patients presenting problems have been discussed, and they are in agreement with the care plan formulated and outlined with them. I have encouraged them to ask questions as they arise throughout their visit. Progress Notes:  ED Course as of 07/07/22 1159   Wed Jul 06, 2022   0214 Feels better after IV fluids, Reglan, Dilaudid. Noted hypokalemia and treated with potassium in ED., tolerating p.o., ready for discharge. [AO]      ED Course User Index  [AO] Ines Almanza MD       Disposition:  Discharge home    PLAN:  1. Discharge Medication List as of 7/6/2022  2:24 AM        2. Follow-up Information     Follow up With Specialties Details Why Contact Info    [facility] EMERGENCY DEPT Emergency Medicine  If symptoms worsen [address] [place] [address]    Dorian Fisher MD Gastroenterology Schedule an appointment as soon as possible for a visit   [phone]      the primary care doctor of your choice  Schedule an appointment as soon as possible for a visit           Return to ED if worse     Diagnosis     Clinical Impression:   1. Nausea and vomiting, unspecified vomiting type    2. Abdominal pain, epigastric    3.  Hypokalemia

## 2022-07-09 ENCOUNTER — APPOINTMENT (OUTPATIENT)
Dept: GENERAL RADIOLOGY | Age: 37
End: 2022-07-09
Attending: EMERGENCY MEDICINE
Payer: MEDICARE

## 2022-07-09 ENCOUNTER — APPOINTMENT (OUTPATIENT)
Dept: CT IMAGING | Age: 37
End: 2022-07-09
Attending: EMERGENCY MEDICINE
Payer: MEDICARE

## 2022-07-09 ENCOUNTER — HOSPITAL ENCOUNTER (EMERGENCY)
Age: 37
Discharge: HOME | End: 2022-07-09
Attending: EMERGENCY MEDICINE
Payer: MEDICARE

## 2022-07-09 VITALS
SYSTOLIC BLOOD PRESSURE: 129 MMHG | HEART RATE: 97 BPM | HEIGHT: 64 IN | RESPIRATION RATE: 20 BRPM | WEIGHT: 136 LBS | DIASTOLIC BLOOD PRESSURE: 89 MMHG | BODY MASS INDEX: 23.22 KG/M2 | TEMPERATURE: 97.9 F | OXYGEN SATURATION: 100 %

## 2022-07-09 DIAGNOSIS — K29.90 GASTRODUODENITIS: Primary | ICD-10-CM

## 2022-07-09 DIAGNOSIS — L97.501 ULCER OF FOOT, LIMITED TO BREAKDOWN OF SKIN, UNSPECIFIED LATERALITY (HCC): ICD-10-CM

## 2022-07-09 DIAGNOSIS — K31.84 GASTROPARESIS: ICD-10-CM

## 2022-07-09 LAB
ALBUMIN SERPL-MCNC: 3.7 G/DL (ref 3.5–5)
ALBUMIN/GLOB SERPL: 0.9 {RATIO} (ref 1.1–2.2)
ALP SERPL-CCNC: 92 U/L (ref 45–117)
ALT SERPL-CCNC: 26 U/L (ref 12–78)
ANION GAP SERPL CALC-SCNC: 7 MMOL/L (ref 5–15)
AST SERPL-CCNC: 21 U/L (ref 15–37)
BASOPHILS # BLD: 0.1 K/UL (ref 0–0.1)
BASOPHILS NFR BLD: 1 % (ref 0–1)
BILIRUB SERPL-MCNC: 0.2 MG/DL (ref 0.2–1)
BUN SERPL-MCNC: 24 MG/DL (ref 6–20)
BUN/CREAT SERPL: 17 (ref 12–20)
CALCIUM SERPL-MCNC: 9.1 MG/DL (ref 8.5–10.1)
CHLORIDE SERPL-SCNC: 110 MMOL/L (ref 97–108)
CO2 SERPL-SCNC: 22 MMOL/L (ref 21–32)
CREAT SERPL-MCNC: 1.45 MG/DL (ref 0.55–1.02)
DIFFERENTIAL METHOD BLD: ABNORMAL
EOSINOPHIL # BLD: 0.4 K/UL (ref 0–0.4)
EOSINOPHIL NFR BLD: 3 % (ref 0–7)
ERYTHROCYTE [DISTWIDTH] IN BLOOD BY AUTOMATED COUNT: 15.8 % (ref 11.5–14.5)
GLOBULIN SER CALC-MCNC: 4.2 G/DL (ref 2–4)
GLUCOSE SERPL-MCNC: 99 MG/DL (ref 65–100)
HCT VFR BLD AUTO: 38.5 % (ref 35–47)
HGB BLD-MCNC: 12.4 G/DL (ref 11.5–16)
IMM GRANULOCYTES # BLD AUTO: 0.1 K/UL (ref 0–0.04)
IMM GRANULOCYTES NFR BLD AUTO: 1 % (ref 0–0.5)
LIPASE SERPL-CCNC: 91 U/L (ref 73–393)
LYMPHOCYTES # BLD: 3.2 K/UL (ref 0.8–3.5)
LYMPHOCYTES NFR BLD: 23 % (ref 12–49)
MAGNESIUM SERPL-MCNC: 2.5 MG/DL (ref 1.6–2.4)
MCH RBC QN AUTO: 29.7 PG (ref 26–34)
MCHC RBC AUTO-ENTMCNC: 32.2 G/DL (ref 30–36.5)
MCV RBC AUTO: 92.3 FL (ref 80–99)
MONOCYTES # BLD: 0.7 K/UL (ref 0–1)
MONOCYTES NFR BLD: 5 % (ref 5–13)
NEUTS SEG # BLD: 9.1 K/UL (ref 1.8–8)
NEUTS SEG NFR BLD: 67 % (ref 32–75)
NRBC # BLD: 0 K/UL (ref 0–0.01)
NRBC BLD-RTO: 0 PER 100 WBC
PLATELET # BLD AUTO: 425 K/UL (ref 150–400)
PMV BLD AUTO: 9.9 FL (ref 8.9–12.9)
POTASSIUM SERPL-SCNC: 3.2 MMOL/L (ref 3.5–5.1)
PROT SERPL-MCNC: 7.9 G/DL (ref 6.4–8.2)
RBC # BLD AUTO: 4.17 M/UL (ref 3.8–5.2)
SODIUM SERPL-SCNC: 139 MMOL/L (ref 136–145)
WBC # BLD AUTO: 13.6 K/UL (ref 3.6–11)

## 2022-07-09 PROCEDURE — 74177 CT ABD & PELVIS W/CONTRAST: CPT

## 2022-07-09 PROCEDURE — 96374 THER/PROPH/DIAG INJ IV PUSH: CPT

## 2022-07-09 PROCEDURE — 36415 COLL VENOUS BLD VENIPUNCTURE: CPT

## 2022-07-09 PROCEDURE — 74011250636 HC RX REV CODE- 250/636: Performed by: EMERGENCY MEDICINE

## 2022-07-09 PROCEDURE — 83690 ASSAY OF LIPASE: CPT

## 2022-07-09 PROCEDURE — 80053 COMPREHEN METABOLIC PANEL: CPT

## 2022-07-09 PROCEDURE — 96375 TX/PRO/DX INJ NEW DRUG ADDON: CPT

## 2022-07-09 PROCEDURE — 99285 EMERGENCY DEPT VISIT HI MDM: CPT

## 2022-07-09 PROCEDURE — 85025 COMPLETE CBC W/AUTO DIFF WBC: CPT

## 2022-07-09 PROCEDURE — 83735 ASSAY OF MAGNESIUM: CPT

## 2022-07-09 PROCEDURE — 73630 X-RAY EXAM OF FOOT: CPT

## 2022-07-09 PROCEDURE — 74011000636 HC RX REV CODE- 636

## 2022-07-09 RX ORDER — MORPHINE SULFATE 2 MG/ML
4 INJECTION, SOLUTION INTRAMUSCULAR; INTRAVENOUS ONCE
Status: COMPLETED | OUTPATIENT
Start: 2022-07-09 | End: 2022-07-09

## 2022-07-09 RX ORDER — DROPERIDOL 2.5 MG/ML
0.62 INJECTION, SOLUTION INTRAMUSCULAR; INTRAVENOUS ONCE
Status: COMPLETED | OUTPATIENT
Start: 2022-07-09 | End: 2022-07-09

## 2022-07-09 RX ORDER — FAMOTIDINE 20 MG/1
20 TABLET, FILM COATED ORAL 2 TIMES DAILY
Qty: 20 TABLET | Refills: 0 | Status: SHIPPED | OUTPATIENT
Start: 2022-07-09 | End: 2022-07-19

## 2022-07-09 RX ORDER — ONDANSETRON 4 MG/1
4 TABLET, FILM COATED ORAL
Qty: 20 TABLET | Refills: 0 | Status: SHIPPED | OUTPATIENT
Start: 2022-07-09

## 2022-07-09 RX ORDER — SULFAMETHOXAZOLE AND TRIMETHOPRIM 800; 160 MG/1; MG/1
1 TABLET ORAL 2 TIMES DAILY
Qty: 14 TABLET | Refills: 0 | Status: SHIPPED | OUTPATIENT
Start: 2022-07-09 | End: 2022-07-16

## 2022-07-09 RX ORDER — KETOROLAC TROMETHAMINE 30 MG/ML
15 INJECTION, SOLUTION INTRAMUSCULAR; INTRAVENOUS ONCE
Status: COMPLETED | OUTPATIENT
Start: 2022-07-09 | End: 2022-07-09

## 2022-07-09 RX ORDER — DICYCLOMINE HYDROCHLORIDE 20 MG/1
20 TABLET ORAL EVERY 6 HOURS
Qty: 20 TABLET | Refills: 0 | Status: SHIPPED | OUTPATIENT
Start: 2022-07-09

## 2022-07-09 RX ORDER — BACITRACIN 500 [USP'U]/G
OINTMENT TOPICAL 3 TIMES DAILY
Qty: 1 EACH | Refills: 0 | Status: SHIPPED | OUTPATIENT
Start: 2022-07-09 | End: 2022-07-19

## 2022-07-09 RX ADMIN — DROPERIDOL 0.62 MG: 2.5 INJECTION, SOLUTION INTRAMUSCULAR; INTRAVENOUS at 16:53

## 2022-07-09 RX ADMIN — MORPHINE SULFATE 4 MG: 2 INJECTION, SOLUTION INTRAMUSCULAR; INTRAVENOUS at 17:27

## 2022-07-09 RX ADMIN — KETOROLAC TROMETHAMINE 15 MG: 30 INJECTION, SOLUTION INTRAMUSCULAR at 16:50

## 2022-07-09 RX ADMIN — IOPAMIDOL 100 ML: 755 INJECTION, SOLUTION INTRAVENOUS at 16:36

## 2022-07-09 NOTE — ED PROVIDER NOTES
[facility]      Date: 7/9/2022  Patient Name: Sukhjinder Tubbs    History of Presenting Illness     Chief Complaint   Patient presents with    Abdominal Pain     Pt arrives to ED via EMS with a cc of abdominal pain and vomiting x 6 days; pt is deaf; hx of gastroparesis    Foot Pain     left foot pain with blister to left 5th toe and right great toe       History Provided By: Patient    HPI: Sukhjinder Tubbs, 42 y.o. female with a past medical history significant for Deaf, anxiety disorder, type 1 diabetes, medical problems as stated below, history of gastroparesis presents to the ED with cc of moderate to severe crampy abdominal pain x6 days, nausea and vomiting. Patient reports that she has been walking on the outside of her foot and her right foot is severely painful. She believes it is because of the way she is walking. She reports a small ulceration there but no discharge and no fevers. No diarrhea and last bowel movement was yesterday and she is passing flatus. Patient reports long history of similar symptoms. No other associated symptoms. No other exacerbating ameliorating factors. Patient recently moved from [place], was priorly on pain management and on Suboxone but is off all these medications currently. She reports she needs a pain management doctor and has no opiates and is just taking over-the-counter medications. She is also taking regular marijuana for anxiety and has recently been admitted to the hospital and diagnosed with hyperemesis cannabinoid syndrome. There are no other complaints, changes, or physical findings at this time. PCP: None    No current facility-administered medications on file prior to encounter. Current Outpatient Medications on File Prior to Encounter   Medication Sig Dispense Refill    metoclopramide HCl (Reglan) 10 mg tablet Take 1 Tablet by mouth every six (6) hours as needed for Nausea for up to 10 days.  12 Tablet 0    pantoprazole (Protonix) 40 mg tablet Take 1 Tablet by mouth daily for 30 days. 30 Tablet 0    insulin aspart U-100 (NOVOLOG) 100 unit/mL injection by SubCUTAneous route.  [DISCONTINUED] dicyclomine (BENTYL) 20 mg tablet Take  by mouth.  BUSPIRONE HCL (BUSPAR PO) Take  by mouth. Past History     Past Medical History:  Past Medical History:   Diagnosis Date    Anxiety     Deaf     Fibromyalgia     Musculoskeletal disorder     back problems    Pleurisy        Past Surgical History:  No past surgical history on file. Family History:  No family history on file. Social History:  Social History     Tobacco Use    Smoking status: Current Every Day Smoker    Smokeless tobacco: Not on file   Substance Use Topics    Alcohol use: No    Drug use: Yes     Types: Marijuana       Allergies: Allergies   Allergen Reactions    Codeine Nausea and Vomiting    Erythromycin Nausea and Vomiting    Ibuprofen Nausea and Vomiting         Review of Systems   Review of Systems   Constitutional: Negative for chills, diaphoresis, fatigue and fever. HENT: Negative for ear pain and sore throat. Eyes: Negative for pain and redness. Respiratory: Negative for cough and shortness of breath. Cardiovascular: Negative for chest pain and leg swelling. Gastrointestinal: Positive for abdominal pain, nausea and vomiting. Negative for diarrhea. Endocrine: Negative for cold intolerance and heat intolerance. Genitourinary: Negative for flank pain and hematuria. Musculoskeletal: Negative for back pain and neck stiffness. Skin: Negative for rash and wound. Neurological: Negative for dizziness, syncope and headaches. Psychiatric/Behavioral: The patient is nervous/anxious. All other systems reviewed and are negative. Physical Exam   Physical Exam  Vitals and nursing note reviewed. Constitutional:       General: She is in acute distress. Appearance: She is well-developed.  She is not ill-appearing. HENT:      Head: Normocephalic and atraumatic. Mouth/Throat:      Pharynx: No oropharyngeal exudate. Eyes:      Conjunctiva/sclera: Conjunctivae normal.      Pupils: Pupils are equal, round, and reactive to light. Cardiovascular:      Rate and Rhythm: Normal rate and regular rhythm. Heart sounds: No murmur heard. Pulmonary:      Effort: Pulmonary effort is normal. No respiratory distress. Breath sounds: Normal breath sounds. No wheezing. Abdominal:      General: Bowel sounds are increased. There is no distension. Palpations: Abdomen is soft. Tenderness: There is generalized abdominal tenderness. There is no right CVA tenderness, left CVA tenderness, guarding or rebound. Musculoskeletal:         General: No deformity. Normal range of motion. Cervical back: Normal range of motion. Skin:     General: Skin is warm and dry. Findings: No rash. Neurological:      Mental Status: She is alert and oriented to person, place, and time. Coordination: Coordination normal.   Psychiatric:         Behavior: Behavior normal.         Diagnostic Study Results     Labs -     Recent Results (from the past 24 hour(s))   CBC WITH AUTOMATED DIFF    Collection Time: 07/09/22  3:38 PM   Result Value Ref Range    WBC 13.6 (H) 3.6 - 11.0 K/uL    RBC 4.17 3.80 - 5.20 M/uL    HGB 12.4 11.5 - 16.0 g/dL    HCT 38.5 35.0 - 47.0 %    MCV 92.3 80.0 - 99.0 FL    MCH 29.7 26.0 - 34.0 PG    MCHC 32.2 30.0 - 36.5 g/dL    RDW 15.8 (H) 11.5 - 14.5 %    PLATELET 377 (H) 821 - 400 K/uL    MPV 9.9 8.9 - 12.9 FL    NRBC 0.0 0  WBC    ABSOLUTE NRBC 0.00 0.00 - 0.01 K/uL    NEUTROPHILS 67 32 - 75 %    LYMPHOCYTES 23 12 - 49 %    MONOCYTES 5 5 - 13 %    EOSINOPHILS 3 0 - 7 %    BASOPHILS 1 0 - 1 %    IMMATURE GRANULOCYTES 1 (H) 0.0 - 0.5 %    ABS. NEUTROPHILS 9.1 (H) 1.8 - 8.0 K/UL    ABS. LYMPHOCYTES 3.2 0.8 - 3.5 K/UL    ABS. MONOCYTES 0.7 0.0 - 1.0 K/UL    ABS.  EOSINOPHILS 0.4 0.0 - 0.4 K/UL    ABS. BASOPHILS 0.1 0.0 - 0.1 K/UL    ABS. IMM. GRANS. 0.1 (H) 0.00 - 0.04 K/UL    DF AUTOMATED     METABOLIC PANEL, COMPREHENSIVE    Collection Time: 07/09/22  3:38 PM   Result Value Ref Range    Sodium 139 136 - 145 mmol/L    Potassium 3.2 (L) 3.5 - 5.1 mmol/L    Chloride 110 (H) 97 - 108 mmol/L    CO2 22 21 - 32 mmol/L    Anion gap 7 5 - 15 mmol/L    Glucose 99 65 - 100 mg/dL    BUN 24 (H) 6 - 20 MG/DL    Creatinine 1.45 (H) 0.55 - 1.02 MG/DL    BUN/Creatinine ratio 17 12 - 20      GFR est AA 49 (L) >60 ml/min/1.73m2    GFR est non-AA 40 (L) >60 ml/min/1.73m2    Calcium 9.1 8.5 - 10.1 MG/DL    Bilirubin, total 0.2 0.2 - 1.0 MG/DL    ALT (SGPT) 26 12 - 78 U/L    AST (SGOT) 21 15 - 37 U/L    Alk. phosphatase 92 45 - 117 U/L    Protein, total 7.9 6.4 - 8.2 g/dL    Albumin 3.7 3.5 - 5.0 g/dL    Globulin 4.2 (H) 2.0 - 4.0 g/dL    A-G Ratio 0.9 (L) 1.1 - 2.2     LIPASE    Collection Time: 07/09/22  3:38 PM   Result Value Ref Range    Lipase 91 73 - 393 U/L   MAGNESIUM    Collection Time: 07/09/22  3:38 PM   Result Value Ref Range    Magnesium 2.5 (H) 1.6 - 2.4 mg/dL       Radiologic Studies -   CT ABD PELV W CONT   Final Result   Findings suggestive of gastroduodenitis      XR FOOT LT MIN 3 V   Final Result   No acute fracture. CT Results  (Last 48 hours)               07/09/22 1634  CT ABD PELV W CONT Final result    Impression:  Findings suggestive of gastroduodenitis       Narrative:  EXAM: CT ABD PELV W CONT       INDICATION: Diffuse abdominal pain, vomiting        COMPARISON: CT abdomen and pelvis 6/24/2022        CONTRAST: 100 mL of Isovue-370. ORAL CONTRAST: None       TECHNIQUE:    Following the uneventful intravenous administration of contrast, thin axial   images were obtained through the abdomen and pelvis. Coronal and sagittal   reconstructions were generated.  CT dose reduction was achieved through use of a   standardized protocol tailored for this examination and automatic exposure   control for dose modulation. FINDINGS:    LOWER THORAX: No significant abnormality in the incidentally imaged lower chest.   LIVER: No mass. BILIARY TREE: Gallbladder is within normal limits. CBD is not dilated. SPLEEN: within normal limits. PANCREAS: No mass or ductal dilatation. ADRENALS: Unremarkable. KIDNEYS: No mass, calculus, or hydronephrosis. STOMACH: Diffuse circumferential wall thickening. SMALL BOWEL: No dilatation. Proximal duodenal wall thickening. COLON: No dilatation or wall thickening. APPENDIX: Normal.   PERITONEUM: No ascites or pneumoperitoneum. RETROPERITONEUM: No lymphadenopathy or aortic aneurysm. REPRODUCTIVE ORGANS: Uterus and adnexa are within normal limits. URINARY BLADDER: No mass or calculus. BONES: No destructive bone lesion. Partially visualized right proximal femoral   internal fixation. ABDOMINAL WALL: No mass or hernia. ADDITIONAL COMMENTS: N/A               CXR Results  (Last 48 hours)    None            Medical Decision Making   I am the first provider for this patient. I reviewed the vital signs, available nursing notes, past medical history, past surgical history, family history and social history. Vital Signs-Reviewed the patient's vital signs. Patient Vitals for the past 12 hrs:   Temp Pulse Resp BP SpO2   07/09/22 1730 97.9 °F (36.6 °C) 97 20 129/89 100 %   07/09/22 1512 97.7 °F (36.5 °C) (!) 107 18 (!) 140/87 100 %   07/09/22 1502 97.7 °F (36.5 °C) (!) 107 18 131/81 100 %       Records Reviewed: Nursing records and medical records reviewed    MDM:  Pt presents with acute abdominal pain; vital signs stable with currently a non-peritoneal exam; DDx includes: Gastroenteritis, hepatitis, pancreatitis, obstruction, appendicitis, viral illness, IBD, diverticulitis, mesenteric ischemia, AAA or descending dissection, ACS, kidney stone.  Will get labs, treat symptomatically and obtain serial abdominal exams to determine if a CT is warranted. Will reassess and monitor closely. Provider Notes (Medical Decision Making):   Patient a 38-year-old female presenting with complaints of diffuse abdominal discomfort, nausea vomiting, and an ulceration to the foot. Small that it is very tender and is over the fifth digit. Given these findings and mild redness I will treat with a course of Bactrim. Patient abdominal pain is an acute exacerbation of her chronic condition likely due to diabetes, gastroparesis, marijuana use. We will treat her symptomatically, have her follow-up with her outpatient providers and provide a referral for pain management at her request.    ED Course:   Initial assessment performed. The patients presenting problems have been discussed, and they are in agreement with the care plan formulated and outlined with them. I have encouraged them to ask questions as they arise throughout their visit. Critical Care:  None      Disposition:  12:27 AM  Manjeet Calender  results have been reviewed with her. She has been counseled regarding her diagnosis. She verbally conveys understanding and agreement of the signs, symptoms, diagnosis, treatment and prognosis and additionally agrees to follow up as recommended with Dr. None in 24 - 48 hours. She also agrees with the care-plan and conveys that all of her questions have been answered. I have also put together some discharge instructions for her that include: 1) educational information regarding their diagnosis, 2) how to care for their diagnosis at home, as well a 3) list of reasons why they would want to return to the ED prior to their follow-up appointment, should their condition change. DISCHARGE PLAN:  1. Current Discharge Medication List      START taking these medications    Details   famotidine (Pepcid) 20 mg tablet Take 1 Tablet by mouth two (2) times a day for 10 days.   Qty: 20 Tablet, Refills: 0  Start date: 7/9/2022, End date: 7/19/2022 trimethoprim-sulfamethoxazole (Bactrim DS) 160-800 mg per tablet Take 1 Tablet by mouth two (2) times a day for 7 days. Qty: 14 Tablet, Refills: 0  Start date: 7/9/2022, End date: 7/16/2022      bacitracin (BACITRACIN) 500 unit/gram oint Apply  to affected area three (3) times daily for 10 days. Apply to affected area  Qty: 1 Each, Refills: 0  Start date: 7/9/2022, End date: 7/19/2022      ondansetron hcl (Zofran) 4 mg tablet Take 1 Tablet by mouth every eight (8) hours as needed for Nausea. Qty: 20 Tablet, Refills: 0  Start date: 7/9/2022         CONTINUE these medications which have CHANGED    Details   dicyclomine (BENTYL) 20 mg tablet Take 1 Tablet by mouth every six (6) hours. Qty: 20 Tablet, Refills: 0  Start date: 7/9/2022           2. Follow-up Information     Follow up With Specialties Details Why [address] [facility] End[facility]ology Endocrinology In 1 week For a follow-up evaluation. [facility]  [phone]    Pain Management Resources  In 1 week For a follow-up evaluation. Your Primary Care Physician  In 1 week For a follow-up evaluation. [facility] EMERGENCY DEPT Emergency Medicine In 2 days If symptoms worsen [address] [place] [address]  [phone]        3. Return to ED if worse     Diagnosis     Clinical Impression:   1. Gastroduodenitis    2. Gastroparesis    3. Ulcer of foot, limited to breakdown of skin, unspecified laterality [facility])        Attestations:    Anshul Cline MD    Please note that this dictation was completed with [employer], the [employer] voice recognition software. Quite often unanticipated grammatical, syntax, homophones, and other interpretive errors are inadvertently transcribed by the computer software. Please disregard these errors. Please excuse any errors that have escaped final proofreading. Thank you.

## 2022-07-09 NOTE — ED NOTES
Patient's brother was supposed to come pick her up but isn't answering his phone. Talked to sister who advised that she doesn't have a vehicle right now to come get her. Talked to charge RN and we were able to find her a ride through [facility]. They will be here in approximately 30 minutes to get her and she will be waiting the waiting room until then.

## 2022-07-09 NOTE — DISCHARGE INSTRUCTIONS
List of Pain Management Specialists:    Treesita Ramirez M.D. [phone] Pain Management  Kelsey Mejia M.D. [phone] Physical Medicine and Arsh Pfeiffer M.D. [phone] Physical Medicine and Judge Jovana M.D. [phone] Pain Management  Anusha Selby M.D. [phone] Pain Management    Dr. Elkin Wren, [facility] [address], [address] [facility], [address] [place] [address], [address] [place] [address]  [phone]    [facility]                            MAYELIN Chakraborty MD DO  [address]                               [address] [facility], [address]  [phone]    Dr. Vipin Henry. [address] [phone]                                                                            Dr. Leigh Larios, [facility] [address] [facility] Kyle Vargas  [address] [facility]                                           [address] [facility], [address] [facility], [address] [facility], [facility] [address]  [email or web address]                                            [phone]  [phone] Dr. Renee Valentin, [address]  [place], [address] [place] [address] Pkwy       [phone]              Dr. Amaya [place]                                [place], [address] [place] [address]  21       Dr. Gary Demarco  [facility] [address] [place] [address]. [address] [place] [address]. Nafisa Sigala [address], [address] [facility], [address] [place] [address]  [phone]  [phone]     Please also consider natural alternatives to pain relief such as physical therapy, massage therapy, acupuncture, chiropractors, reflexology, and trigger point injections.

## 2022-07-09 NOTE — ED NOTES
Patient asked for Morphine for her foot and when I told her it wasn't ordered, she said she was going to leave.  I told her to let me see if I can get the doctor to talk to her first.

## 2022-07-09 NOTE — ED NOTES
Patient discharged from the ED by Dr. Gómez Rodarte. Diagnosis, medications, precautions and follow-ups were reviewed with the patient/family. Questions were asked and answered prior to departure. Patient departed the ED via ambulation and was accompanied by herself.

## 2022-07-23 PROBLEM — N39.0 UTI (URINARY TRACT INFECTION): Status: RESOLVED | Noted: 2022-06-23 | Resolved: 2022-07-23

## 2022-09-28 ENCOUNTER — APPOINTMENT (OUTPATIENT)
Dept: GENERAL RADIOLOGY | Age: 37
End: 2022-09-28
Attending: STUDENT IN AN ORGANIZED HEALTH CARE EDUCATION/TRAINING PROGRAM
Payer: MEDICARE

## 2022-09-28 ENCOUNTER — HOSPITAL ENCOUNTER (EMERGENCY)
Age: 37
Discharge: HOME | End: 2022-09-28
Attending: STUDENT IN AN ORGANIZED HEALTH CARE EDUCATION/TRAINING PROGRAM
Payer: MEDICARE

## 2022-09-28 VITALS
HEART RATE: 110 BPM | TEMPERATURE: 97.8 F | HEIGHT: 64 IN | BODY MASS INDEX: 23.34 KG/M2 | OXYGEN SATURATION: 99 % | DIASTOLIC BLOOD PRESSURE: 100 MMHG | RESPIRATION RATE: 16 BRPM | SYSTOLIC BLOOD PRESSURE: 136 MMHG

## 2022-09-28 DIAGNOSIS — M25.561 ACUTE PAIN OF RIGHT KNEE: ICD-10-CM

## 2022-09-28 DIAGNOSIS — K08.89 PAIN, DENTAL: Primary | ICD-10-CM

## 2022-09-28 PROCEDURE — 74011000250 HC RX REV CODE- 250: Performed by: STUDENT IN AN ORGANIZED HEALTH CARE EDUCATION/TRAINING PROGRAM

## 2022-09-28 PROCEDURE — 74011250637 HC RX REV CODE- 250/637: Performed by: STUDENT IN AN ORGANIZED HEALTH CARE EDUCATION/TRAINING PROGRAM

## 2022-09-28 PROCEDURE — 73502 X-RAY EXAM HIP UNI 2-3 VIEWS: CPT

## 2022-09-28 PROCEDURE — 73562 X-RAY EXAM OF KNEE 3: CPT

## 2022-09-28 PROCEDURE — 99283 EMERGENCY DEPT VISIT LOW MDM: CPT

## 2022-09-28 RX ORDER — AMOXICILLIN AND CLAVULANATE POTASSIUM 875; 125 MG/1; MG/1
1 TABLET, FILM COATED ORAL
Status: COMPLETED | OUTPATIENT
Start: 2022-09-28 | End: 2022-09-28

## 2022-09-28 RX ORDER — OXYCODONE HYDROCHLORIDE 5 MG/1
5 TABLET ORAL
Qty: 8 TABLET | Refills: 0 | Status: SHIPPED | OUTPATIENT
Start: 2022-09-28 | End: 2022-09-30

## 2022-09-28 RX ORDER — OXYCODONE HYDROCHLORIDE 5 MG/1
5 TABLET ORAL
Status: COMPLETED | OUTPATIENT
Start: 2022-09-28 | End: 2022-09-28

## 2022-09-28 RX ORDER — AMOXICILLIN AND CLAVULANATE POTASSIUM 875; 125 MG/1; MG/1
1 TABLET, FILM COATED ORAL 2 TIMES DAILY
Qty: 14 TABLET | Refills: 0 | Status: SHIPPED | OUTPATIENT
Start: 2022-09-28 | End: 2022-10-05

## 2022-09-28 RX ORDER — ACETAMINOPHEN 325 MG/1
975 TABLET ORAL ONCE
Status: COMPLETED | OUTPATIENT
Start: 2022-09-28 | End: 2022-09-28

## 2022-09-28 RX ORDER — ACETAMINOPHEN 325 MG/1
650 TABLET ORAL
Qty: 20 TABLET | Refills: 0 | Status: SHIPPED | OUTPATIENT
Start: 2022-09-28 | End: 2022-10-12

## 2022-09-28 RX ADMIN — AMOXICILLIN AND CLAVULANATE POTASSIUM 1 TABLET: 875; 125 TABLET, FILM COATED ORAL at 14:44

## 2022-09-28 RX ADMIN — DIPHENHYDRAMINE HYDROCHLORIDE: 12.5 LIQUID ORAL at 14:39

## 2022-09-28 RX ADMIN — OXYCODONE 5 MG: 5 TABLET ORAL at 14:37

## 2022-09-28 RX ADMIN — ACETAMINOPHEN 975 MG: 325 TABLET ORAL at 14:37

## 2022-09-28 NOTE — ED NOTES
Taryn CULLEN and Dr. Bret Lucio reviewed discharge instructions with the patient. The patient verbalized understanding. All questions and concerns were addressed. The patient declined a wheelchair and is discharged ambulatory in the care of family members with instructions and prescriptions in hand. Pt is alert and oriented x 4. Respirations are clear and unlabored.

## 2022-09-28 NOTE — ED PROVIDER NOTES
EMERGENCY DEPARTMENT HISTORY AND PHYSICAL EXAM      Date: 9/28/2022  Patient Name: Jane Kaiser    History of Presenting Illness     Chief Complaint   Patient presents with    Knee Pain     Patient states she fell but unsure if it was today or not, denies any LOC, bilateral knees, bared weight for EMS    Jaw Pain     Patient believes she has an infection in her jaw; no swelling or redness noted at triage       History Provided By: Patient    Jane Kaiser, 42 y.o. female     Patient is a 26-year-old female with history of diabetes, deaf used protection services, presenting with concerns of 2 complaints today. First complaint is that patient frequently has history of falls, states that today she fell onto her knees, states that her pain is located in her right knee afterwards and her right hip. Patient states that she ambulates with a cane but occasionally feels as if her legs give out on her and falls down onto her legs, patient states she did not hit her head during this, does not complain of any chest pain, shortness of breath, abdominal pain, patient states that she has been able to ambulate afterwards, did not take anything before coming in. Patient has any chest pain, shortness of breath weakness or dizziness prior to the fall, states that it was mechanical in origin. Unrelated patient also complaining of dental pain, states that she has multiple broken tooth and has pain on her top and bottom gums, patient states that she feels as if pus has been coming out of one of the old tooth site. Patient states that she been trying over-the-counter medications without significant relief, patient is new to [place] and does not have dentistry established. Patient denies any fevers, chills, difficulty swallowing or swelling. No other complaints today. There are no other complaints, changes, or physical findings at this time.     PCP: None    No current facility-administered medications on file prior to encounter. Current Outpatient Medications on File Prior to Encounter   Medication Sig Dispense Refill    dicyclomine (BENTYL) 20 mg tablet Take 1 Tablet by mouth every six (6) hours. 20 Tablet 0    ondansetron hcl (Zofran) 4 mg tablet Take 1 Tablet by mouth every eight (8) hours as needed for Nausea. 20 Tablet 0    insulin aspart U-100 (NOVOLOG) 100 unit/mL injection by SubCUTAneous route.  BUSPIRONE HCL (BUSPAR PO) Take  by mouth. Past History     Past Medical History:  Past Medical History:   Diagnosis Date    Anxiety     Deaf     Fibromyalgia     Musculoskeletal disorder     back problems    Pleurisy        Past Surgical History:  No past surgical history on file. Family History:  No family history on file. Social History:  Social History     Tobacco Use    Smoking status: Every Day   Substance Use Topics    Alcohol use: No    Drug use: Yes     Types: Marijuana       Allergies: Allergies   Allergen Reactions    Codeine Nausea and Vomiting    Erythromycin Nausea and Vomiting    Ibuprofen Nausea and Vomiting         Review of Systems   Review of Systems   Constitutional:  Positive for activity change. Negative for appetite change, chills, fatigue and fever. HENT:  Positive for dental problem. Negative for nosebleeds. Respiratory:  Negative for cough, chest tightness and shortness of breath. Cardiovascular:  Negative for chest pain and leg swelling. Gastrointestinal:  Negative for abdominal pain, constipation, diarrhea, nausea and vomiting. Musculoskeletal:  Positive for arthralgias and gait problem. Negative for myalgias. Skin:  Negative for color change, rash and wound. Neurological:  Negative for weakness and headaches. Psychiatric/Behavioral:  Negative for confusion. Physical Exam   Physical Exam  Vitals reviewed. Constitutional:       General: She is not in acute distress. Appearance: She is not ill-appearing.    HENT:      Head: Normocephalic and atraumatic. Mouth/Throat:      Mouth: Mucous membranes are moist.      Comments: ADentulous multiple Ttp over broken teeth sites   Eyes:      Conjunctiva/sclera: Conjunctivae normal.   Cardiovascular:      Rate and Rhythm: Normal rate. Pulses: Normal pulses. Pulmonary:      Effort: Pulmonary effort is normal. No respiratory distress. Abdominal:      General: Abdomen is flat. Palpations: Abdomen is soft. Tenderness: There is no abdominal tenderness. There is no guarding or rebound. Musculoskeletal:         General: Tenderness (TTP over R knee, abulates with cane) present. No deformity. Cervical back: Normal range of motion and neck supple. Skin:     General: Skin is warm and dry. Findings: No bruising. Neurological:      General: No focal deficit present. Mental Status: She is alert and oriented to person, place, and time. Psychiatric:         Mood and Affect: Mood normal.         Behavior: Behavior normal.       Diagnostic Study Results     Labs -   No results found for this or any previous visit (from the past 24 hour(s)). Radiologic Studies -   XR HIP RT W OR WO PELV 2-3 VWS   Final Result   No acute abnormality. XR KNEE RT 3 V   Final Result   No acute abnormality. CT Results  (Last 48 hours)      None          CXR Results  (Last 48 hours)      None              Medical Decision Making   I am the first provider for this patient. I reviewed the vital signs, available nursing notes, past medical history, past surgical history, family history and social history. Vital Signs-Reviewed the patient's vital signs. Patient Vitals for the past 12 hrs:   Temp Pulse Resp BP SpO2   09/28/22 1259 97.8 °F (36.6 °C) (!) 110 16 (!) 136/100 99 %       Records Reviewed: Nursing records and medical records reviewed    Ddx: Dental pain, tooth infection, right knee pain, ground-level fall    Initial assessment performed.  The patients presenting problems have been discussed, and they are in agreement with the care plan formulated and outlined with them. I have encouraged them to ask questions as they arise throughout their visit. MDM  Number of Diagnoses or Management Options  Acute pain of right knee  Pain, dental  Diagnosis management comments: Patient is a 64-year-old female presenting with concerns of 2 complaints today, 1 is right knee and right hip pain after a fall onto legs today, patient states that she felt unsteady on her feet, walks with a cane but was unable to support herself, fell onto her knee. Patient has been able to walk afterwards, complaining of pain focally located in right knee, tenderness to palpation but no overlying skin abrasions or significant swelling. Patient able to fully range the knee, also able to range the right hip but states that she has pain there as well, previous injury to that right hip. We will plan on images of the hip and knee and provide patient with pain medication while in ED. We will also provide patient with antibiotic and topical medication for gum pain, suspicious of tooth infection due to history of broken teeth.   No constitutional symptoms, do not feel that lab work is indicated at this time, will provide patient with medications, and refer to dental school as she is new to the area and will knee follow up              Medications Administered       acetaminophen (TYLENOL) tablet 975 mg       Admin Date  09/28/2022 Action  Given Dose  975 mg Route  Oral Administered By  Kathy Brock RN              amoxicillin-clavulanate (AUGMENTIN) 875-125 mg per tablet 1 Tablet       Admin Date  09/28/2022 Action  Given Dose  1 Tablet Route  Oral Administered By  Kathy Brock RN              dental ball (lidocaine/Benadryl/Cetacaine) mixture       Admin Date  09/28/2022 Action  Given Dose   Route  Mucous Membrane Administered By  Kathy Brock RN              oxyCODONE IR (ROXICODONE) tablet 5 mg       Admin Date  09/28/2022 Action  Given Dose  5 mg Route  Oral Administered By  Donald Faustin RN                    Procedures      Disposition: Discharge Note:  3:31 PM  The patient has been re-evaluated and is ready for discharge. Reviewed available results with patient. Counseled patient on diagnosis and care plan. Patient has expressed understanding, and all questions have been answered. Patient agrees with plan and agrees to follow up as recommended, or to return to the ED if their symptoms worsen. Discharge instructions have been provided and explained to the patient, along with reasons to return to the ED. DISCHARGE PLAN:  1. Current Discharge Medication List        START taking these medications    Details   acetaminophen (TYLENOL) 325 mg tablet Take 2 Tablets by mouth every six to eight (6-8) hours as needed for Pain for up to 14 days. Qty: 20 Tablet, Refills: 0  Start date: 9/28/2022, End date: 10/12/2022      oxyCODONE IR (Roxicodone) 5 mg immediate release tablet Take 1 Tablet by mouth every six (6) hours as needed for Pain for up to 2 days. Max Daily Amount: 20 mg.  Qty: 8 Tablet, Refills: 0  Start date: 9/28/2022, End date: 9/30/2022    Associated Diagnoses: Pain, dental      amoxicillin-clavulanate (Augmentin) 875-125 mg per tablet Take 1 Tablet by mouth two (2) times a day for 7 days. Qty: 14 Tablet, Refills: 0  Start date: 9/28/2022, End date: 10/5/2022           2. Follow-up Information    None       3. Return to ED if worse     Diagnosis     Clinical Impression:   1. Pain, dental    2. Acute pain of right knee        Attestations:    Dhruv Wilson MD    Please note that this dictation was completed with [employer], the [employer] voice recognition software. Quite often unanticipated grammatical, syntax, homophones, and other interpretive errors are inadvertently transcribed by the computer software. Please disregard these errors. Please excuse any errors that have escaped final proofreading.   Thank you.

## 2022-11-02 ENCOUNTER — APPOINTMENT (OUTPATIENT)
Dept: GENERAL RADIOLOGY | Age: 37
End: 2022-11-02
Attending: EMERGENCY MEDICINE
Payer: MEDICARE

## 2022-11-02 ENCOUNTER — APPOINTMENT (OUTPATIENT)
Dept: GENERAL RADIOLOGY | Age: 37
End: 2022-11-02
Attending: PHYSICIAN ASSISTANT
Payer: MEDICARE

## 2022-11-02 ENCOUNTER — HOSPITAL ENCOUNTER (EMERGENCY)
Age: 37
Discharge: HOME | End: 2022-11-02
Attending: EMERGENCY MEDICINE
Payer: MEDICARE

## 2022-11-02 VITALS
TEMPERATURE: 98.4 F | BODY MASS INDEX: 25.66 KG/M2 | DIASTOLIC BLOOD PRESSURE: 72 MMHG | HEART RATE: 107 BPM | SYSTOLIC BLOOD PRESSURE: 103 MMHG | WEIGHT: 154 LBS | RESPIRATION RATE: 20 BRPM | HEIGHT: 65 IN | OXYGEN SATURATION: 100 %

## 2022-11-02 DIAGNOSIS — S82.832A OTHER CLOSED FRACTURE OF DISTAL END OF LEFT FIBULA, INITIAL ENCOUNTER: ICD-10-CM

## 2022-11-02 DIAGNOSIS — S82.831A OTHER CLOSED FRACTURE OF DISTAL END OF RIGHT FIBULA, INITIAL ENCOUNTER: Primary | ICD-10-CM

## 2022-11-02 DIAGNOSIS — E10.65 TYPE 1 DIABETES MELLITUS WITH HYPERGLYCEMIA (HCC): ICD-10-CM

## 2022-11-02 LAB
ALBUMIN SERPL-MCNC: 3.2 G/DL (ref 3.5–5)
ALBUMIN/GLOB SERPL: 0.8 {RATIO} (ref 1.1–2.2)
ALP SERPL-CCNC: 99 U/L (ref 45–117)
ALT SERPL-CCNC: 25 U/L (ref 12–78)
ANION GAP SERPL CALC-SCNC: 5 MMOL/L (ref 5–15)
AST SERPL-CCNC: 16 U/L (ref 15–37)
BASOPHILS # BLD: 0.1 K/UL (ref 0–0.1)
BASOPHILS NFR BLD: 1 % (ref 0–1)
BILIRUB SERPL-MCNC: 0.3 MG/DL (ref 0.2–1)
BUN SERPL-MCNC: 22 MG/DL (ref 6–20)
BUN/CREAT SERPL: 24 (ref 12–20)
CALCIUM SERPL-MCNC: 9 MG/DL (ref 8.5–10.1)
CHLORIDE SERPL-SCNC: 107 MMOL/L (ref 97–108)
CO2 SERPL-SCNC: 23 MMOL/L (ref 21–32)
CREAT SERPL-MCNC: 0.9 MG/DL (ref 0.55–1.02)
DIFFERENTIAL METHOD BLD: ABNORMAL
EOSINOPHIL # BLD: 0.6 K/UL (ref 0–0.4)
EOSINOPHIL NFR BLD: 7 % (ref 0–7)
ERYTHROCYTE [DISTWIDTH] IN BLOOD BY AUTOMATED COUNT: 14.7 % (ref 11.5–14.5)
GLOBULIN SER CALC-MCNC: 4.2 G/DL (ref 2–4)
GLUCOSE BLD STRIP.AUTO-MCNC: 238 MG/DL (ref 65–117)
GLUCOSE SERPL-MCNC: 251 MG/DL (ref 65–100)
HCT VFR BLD AUTO: 34.7 % (ref 35–47)
HGB BLD-MCNC: 10.5 G/DL (ref 11.5–16)
IMM GRANULOCYTES # BLD AUTO: 0 K/UL (ref 0–0.04)
IMM GRANULOCYTES NFR BLD AUTO: 0 % (ref 0–0.5)
LYMPHOCYTES # BLD: 2.4 K/UL (ref 0.8–3.5)
LYMPHOCYTES NFR BLD: 26 % (ref 12–49)
MCH RBC QN AUTO: 27.9 PG (ref 26–34)
MCHC RBC AUTO-ENTMCNC: 30.3 G/DL (ref 30–36.5)
MCV RBC AUTO: 92.3 FL (ref 80–99)
MONOCYTES # BLD: 0.5 K/UL (ref 0–1)
MONOCYTES NFR BLD: 6 % (ref 5–13)
NEUTS SEG # BLD: 5.6 K/UL (ref 1.8–8)
NEUTS SEG NFR BLD: 60 % (ref 32–75)
NRBC # BLD: 0 K/UL (ref 0–0.01)
NRBC BLD-RTO: 0 PER 100 WBC
PLATELET # BLD AUTO: 260 K/UL (ref 150–400)
PMV BLD AUTO: 11.2 FL (ref 8.9–12.9)
POTASSIUM SERPL-SCNC: 4.2 MMOL/L (ref 3.5–5.1)
PROT SERPL-MCNC: 7.4 G/DL (ref 6.4–8.2)
RBC # BLD AUTO: 3.76 M/UL (ref 3.8–5.2)
SERVICE CMNT-IMP: ABNORMAL
SODIUM SERPL-SCNC: 135 MMOL/L (ref 136–145)
WBC # BLD AUTO: 9.2 K/UL (ref 3.6–11)

## 2022-11-02 PROCEDURE — 36415 COLL VENOUS BLD VENIPUNCTURE: CPT

## 2022-11-02 PROCEDURE — 73630 X-RAY EXAM OF FOOT: CPT

## 2022-11-02 PROCEDURE — 82962 GLUCOSE BLOOD TEST: CPT

## 2022-11-02 PROCEDURE — 73610 X-RAY EXAM OF ANKLE: CPT

## 2022-11-02 PROCEDURE — 74011250637 HC RX REV CODE- 250/637: Performed by: PHYSICIAN ASSISTANT

## 2022-11-02 PROCEDURE — 85025 COMPLETE CBC W/AUTO DIFF WBC: CPT

## 2022-11-02 PROCEDURE — 80053 COMPREHEN METABOLIC PANEL: CPT

## 2022-11-02 PROCEDURE — 99284 EMERGENCY DEPT VISIT MOD MDM: CPT

## 2022-11-02 RX ORDER — OXYCODONE AND ACETAMINOPHEN 5; 325 MG/1; MG/1
1 TABLET ORAL
Qty: 15 TABLET | Refills: 0 | Status: SHIPPED | OUTPATIENT
Start: 2022-11-02 | End: 2022-11-07

## 2022-11-02 RX ORDER — OXYCODONE AND ACETAMINOPHEN 5; 325 MG/1; MG/1
2 TABLET ORAL
Status: COMPLETED | OUTPATIENT
Start: 2022-11-02 | End: 2022-11-02

## 2022-11-02 RX ORDER — IBUPROFEN 600 MG/1
600 TABLET ORAL
Qty: 20 TABLET | Refills: 0 | Status: SHIPPED | OUTPATIENT
Start: 2022-11-02

## 2022-11-02 RX ADMIN — OXYCODONE HYDROCHLORIDE AND ACETAMINOPHEN 2 TABLET: 5; 325 TABLET ORAL at 16:34

## 2022-11-02 NOTE — ED PROVIDER NOTES
EMERGENCY DEPARTMENT HISTORY AND PHYSICAL EXAM      Please note that this dictation was completed with [employer], the computer voice recognition software. Quite often unanticipated grammatical, syntax, homophones, and other interpretive errors are inadvertently transcribed by the computer software. Please disregard these errors. Please excuse any errors that have escaped final proofreading. Date: 11/2/2022  Patient Name: Kishore Mejia    History of Presenting Illness     Chief Complaint   Patient presents with    Fall     Pt arrives to triage via wheelchair w/ sister accompanying her, requiring  - [facility] services in use. Pt reporting that she was seen on Friday and started on baclofen. Pt and sister reporting adverse effects from baclofen including increased ticks, loss of muscle control and unsteady gait. Pt reporting that she fell ambulating from bedroom to bathroom late Friday night. Pt also fell forward while sitting on toilet, she struck countertop under R eye and bent \"both ankles and toes backwards\"     History, review of systems and physical exam were conducted with an ASL  via video translation services. History Provided By: Patient    HPI: Kishore Mejia, 42 y.o. female with a history of deafness, type 1 diabetes on insulin fibromyalgia and others presents ambulatory with her sister to the ED with cc of 5 days of 8 out of 10 constant, achy bilateral ankle and foot pain that is much worse with movement and weightbearing. She tells me this past Friday, 5 days ago, she went to stand up after sitting down to use the toilet and stumbled and fell. She tells me at that time she injured both of her ankles and bumped her head, but was otherwise able to walk and did not think much about it. It was last night that again she was on the toilet and went to stand up when she accidentally twisted both of her ankles. This did cause her to fall forward, however she denies any other injuries. She tells me she was able to make it back to the bed and go to sleep, however when she woke up this morning she had significant pain. She attributed her fall on Friday to remove medication, baclofen, which she has since discontinued. She denies any dizziness or weakness and tells me that she simply stumbled when she stood up. She has been well lately without fever. She denies any knee pain. She tells me she does have some pain in her right hip and back, but that is consistent with her chronic pain. There are no other complaints, changes, or physical findings at this time. PCP: Crystal Krishnamurthy MD    Current Outpatient Medications   Medication Sig Dispense Refill    ibuprofen (MOTRIN) 600 mg tablet Take 1 Tablet by mouth every eight (8) hours as needed for Pain. 20 Tablet 0    oxyCODONE-acetaminophen (Percocet) 5-325 mg per tablet Take 1 Tablet by mouth every six (6) hours as needed for Pain for up to 5 days. Max Daily Amount: 4 Tablets. 15 Tablet 0    dicyclomine (BENTYL) 20 mg tablet Take 1 Tablet by mouth every six (6) hours. 20 Tablet 0    ondansetron hcl (Zofran) 4 mg tablet Take 1 Tablet by mouth every eight (8) hours as needed for Nausea. 20 Tablet 0    insulin aspart U-100 (NOVOLOG) 100 unit/mL injection by SubCUTAneous route.  BUSPIRONE HCL (BUSPAR PO) Take  by mouth. Past History     Past Medical History:  Past Medical History:   Diagnosis Date    Anxiety     Deaf     Fibromyalgia     Musculoskeletal disorder     back problems    Pleurisy        Past Surgical History:  No past surgical history on file. Family History:  No family history on file. Social History:  Social History     Tobacco Use    Smoking status: Every Day   Substance Use Topics    Alcohol use: No    Drug use: Yes     Types: Marijuana       Allergies:   Allergies   Allergen Reactions    Baclofen Nausea Only    Codeine Nausea and Vomiting    Erythromycin Nausea and Vomiting    Ibuprofen Nausea and Vomiting     Review of Systems   Review of Systems   Constitutional:  Negative for fever. HENT:  Negative for sore throat. Eyes:  Negative for pain. Respiratory:  Negative for shortness of breath. Cardiovascular:  Negative for chest pain. Gastrointestinal:  Negative for abdominal pain. Genitourinary:  Negative for flank pain. Musculoskeletal:  Negative for back pain. Bilateral ankle and foot pain   Skin:  Negative for rash. Neurological:  Negative for headaches. Physical Exam   Physical Exam  Vitals and nursing note reviewed. Constitutional:       General: She is not in acute distress. Appearance: She is well-developed. HENT:      Head: Normocephalic and atraumatic. Comments: There is a right periorbital contusion with no bony tenderness. Right Ear: External ear normal.      Left Ear: External ear normal.      Nose: Nose normal.      Mouth/Throat:      Mouth: Mucous membranes are moist.   Eyes:      Conjunctiva/sclera: Conjunctivae normal.      Pupils: Pupils are equal, round, and reactive to light. Comments:   RIGHT EYE:  Sclera are white  No subconjunctival hemorrhage  EOMI without pain or diplopia  PERRL   Cardiovascular:      Rate and Rhythm: Normal rate and regular rhythm. Pulmonary:      Effort: Pulmonary effort is normal. No respiratory distress. Abdominal:      Palpations: Abdomen is soft. Tenderness: There is no abdominal tenderness. Musculoskeletal:         General: Normal range of motion. Cervical back: Full passive range of motion without pain and normal range of motion. Legs:       Comments:   RIGHT ANKLE / FOOT:  There is bruising and swelling of the right ankle, lateral greater than medial.  There is dependent ecchymosis down to the toes. There is no break in the skin. There is diffuse tenderness of the right ankle, lateral greater than medial and dorsal tenderness of the foot.     LEFT ANKLE / FOOT:  There is bruising and swelling of the left ankle, lateral greater than medial.  There is dependent ecchymosis down to the toes. There is no break in the skin. There is diffuse tenderness of the right ankle, lateral greater than medial and dorsal tenderness of the foot. Skin:     Findings: No rash. Neurological:      Mental Status: She is alert and oriented to person, place, and time. She is not disoriented. GCS: GCS eye subscore is 4. GCS verbal subscore is 5. GCS motor subscore is 6. Cranial Nerves: No cranial nerve deficit. Psychiatric:         Speech: Speech normal.     Diagnostic Study Results     Labs -     Recent Results (from the past 12 hour(s))   CBC WITH AUTOMATED DIFF    Collection Time: 11/02/22  2:47 PM   Result Value Ref Range    WBC 9.2 3.6 - 11.0 K/uL    RBC 3.76 (L) 3.80 - 5.20 M/uL    HGB 10.5 (L) 11.5 - 16.0 g/dL    HCT 34.7 (L) 35.0 - 47.0 %    MCV 92.3 80.0 - 99.0 FL    MCH 27.9 26.0 - 34.0 PG    MCHC 30.3 30.0 - 36.5 g/dL    RDW 14.7 (H) 11.5 - 14.5 %    PLATELET 113 701 - 348 K/uL    MPV 11.2 8.9 - 12.9 FL    NRBC 0.0 0  WBC    ABSOLUTE NRBC 0.00 0.00 - 0.01 K/uL    NEUTROPHILS 60 32 - 75 %    LYMPHOCYTES 26 12 - 49 %    MONOCYTES 6 5 - 13 %    EOSINOPHILS 7 0 - 7 %    BASOPHILS 1 0 - 1 %    IMMATURE GRANULOCYTES 0 0.0 - 0.5 %    ABS. NEUTROPHILS 5.6 1.8 - 8.0 K/UL    ABS. LYMPHOCYTES 2.4 0.8 - 3.5 K/UL    ABS. MONOCYTES 0.5 0.0 - 1.0 K/UL    ABS. EOSINOPHILS 0.6 (H) 0.0 - 0.4 K/UL    ABS. BASOPHILS 0.1 0.0 - 0.1 K/UL    ABS. IMM.  GRANS. 0.0 0.00 - 0.04 K/UL    DF AUTOMATED     METABOLIC PANEL, COMPREHENSIVE    Collection Time: 11/02/22  2:47 PM   Result Value Ref Range    Sodium 135 (L) 136 - 145 mmol/L    Potassium 4.2 3.5 - 5.1 mmol/L    Chloride 107 97 - 108 mmol/L    CO2 23 21 - 32 mmol/L    Anion gap 5 5 - 15 mmol/L    Glucose 251 (H) 65 - 100 mg/dL    BUN 22 (H) 6 - 20 MG/DL    Creatinine 0.90 0.55 - 1.02 MG/DL    BUN/Creatinine ratio 24 (H) 12 - 20      eGFR >60 >60 ml/min/1.73m2 Calcium 9.0 8.5 - 10.1 MG/DL    Bilirubin, total 0.3 0.2 - 1.0 MG/DL    ALT (SGPT) 25 12 - 78 U/L    AST (SGOT) 16 15 - 37 U/L    Alk. phosphatase 99 45 - 117 U/L    Protein, total 7.4 6.4 - 8.2 g/dL    Albumin 3.2 (L) 3.5 - 5.0 g/dL    Globulin 4.2 (H) 2.0 - 4.0 g/dL    A-G Ratio 0.8 (L) 1.1 - 2.2     GLUCOSE, POC    Collection Time: 11/02/22  3:07 PM   Result Value Ref Range    Glucose (POC) 238 (H) 65 - 117 mg/dL    Performed by Wendi OROZCOT)        Radiologic Studies -   XR FOOT RT MIN 3 V   Final Result   No acute abnormality. XR FOOT LT MIN 3 V   Final Result   1. 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 Chronic fracture of the proximal shaft of the fourth metatarsal partially   healed   3. No acute fracture. XR ANKLE LT MIN 3 V   Final Result      Acute distal fibular fracture. XR ANKLE RT MIN 3 V   Final Result      Nondisplaced fracture of the distal fibula. CT Results  (Last 48 hours)      None          CXR Results  (Last 48 hours)      None          Medical Decision Making   I am the first provider for this patient. I reviewed the vital signs, available nursing notes, past medical history, past surgical history, family history and social history. Vital Signs-Reviewed the patient's vital signs. Patient Vitals for the past 12 hrs:   Temp Pulse Resp BP SpO2   11/02/22 1424 98.4 °F (36.9 °C) (!) 107 20 103/72 100 %       Pulse Oximetry Analysis - 100% on RA    Records Reviewed: Nursing Notes, Old Medical Records, Previous Radiology Studies, and Previous Laboratory Studies    Provider Notes (Medical Decision Making):   DDx: Fracture, sprain, fall, diabetes, hyperglycemia, HIGINIO, others    Blood sugar is elevated. Renal function is preserved. Plain films of the right ankle demonstrate a nondisplaced fracture of the distal fibula. Plain films of the left ankle demonstrate a mildly displaced fracture of the distal fibula extending to the level of the ankle mortise.   These injuries are closed and she is neurovascularly intact. Will place the patient in a Cam walking boots and will offer crutches. She is instructed to not bear weight on the left ankle. Pain medication is sent to the pharmacy. She is instructed to call orthopedics tomorrow. Information regarding Ortho clinic is provided in her after visit summary. I encourage ice and elevation while at home. ED Course:   Initial assessment performed. The patients presenting problems have been discussed, and they are in agreement with the care plan formulated and outlined with them. I have encouraged them to ask questions as they arise throughout their visit. Disposition:  Discharge    PLAN:  1. Discharge Medication List as of 11/2/2022  6:19 PM        START taking these medications    Details   ibuprofen (MOTRIN) 600 mg tablet Take 1 Tablet by mouth every eight (8) hours as needed for Pain., Normal, Disp-20 Tablet, R-0      oxyCODONE-acetaminophen (Percocet) 5-325 mg per tablet Take 1 Tablet by mouth every six (6) hours as needed for Pain for up to 5 days. Max Daily Amount: 4 Tablets., Normal, Disp-15 Tablet, R-0           CONTINUE these medications which have NOT CHANGED    Details   dicyclomine (BENTYL) 20 mg tablet Take 1 Tablet by mouth every six (6) hours. , Normal, Disp-20 Tablet, R-0      ondansetron hcl (Zofran) 4 mg tablet Take 1 Tablet by mouth every eight (8) hours as needed for Nausea., Normal, Disp-20 Tablet, R-0      insulin aspart U-100 (NOVOLOG) 100 unit/mL injection by SubCUTAneous route., Historical Med      BUSPIRONE HCL (BUSPAR PO) Take  by mouth. Historical Med           2. Follow-up Information       Follow up With Specialties Details Why Contact Info    Deepali Lizama MD Orthopedic Surgery Call  ORTHO: call tomorrow to schedule follow up [address]  [address]  [place] Carrier [phone]            Return to ED if worse     Diagnosis     Clinical Impression:   1. Other closed fracture of distal end of right fibula, initial encounter    2. Other closed fracture of distal end of left fibula, initial encounter    3.  Type 1 diabetes mellitus with hyperglycemia (HCC)

## 2022-11-12 ENCOUNTER — HOSPITAL ENCOUNTER (EMERGENCY)
Age: 37
Discharge: LEFT BEFORE DISCHARGE | End: 2022-11-12
Attending: STUDENT IN AN ORGANIZED HEALTH CARE EDUCATION/TRAINING PROGRAM
Payer: MEDICAID

## 2022-11-12 VITALS
OXYGEN SATURATION: 98 % | WEIGHT: 154 LBS | TEMPERATURE: 97.2 F | HEART RATE: 78 BPM | HEIGHT: 65 IN | RESPIRATION RATE: 18 BRPM | DIASTOLIC BLOOD PRESSURE: 89 MMHG | BODY MASS INDEX: 25.66 KG/M2 | SYSTOLIC BLOOD PRESSURE: 121 MMHG

## 2022-11-12 PROCEDURE — 75810000275 HC EMERGENCY DEPT VISIT NO LEVEL OF CARE

## 2022-11-12 NOTE — ED NOTES
Called from [facility] with no response. Attempt made to call on mobile phone; unable to leave a message.

## 2022-11-14 ENCOUNTER — HOSPITAL ENCOUNTER (OUTPATIENT)
Age: 37
Setting detail: OBSERVATION
Discharge: HOME | End: 2022-11-15
Attending: EMERGENCY MEDICINE | Admitting: STUDENT IN AN ORGANIZED HEALTH CARE EDUCATION/TRAINING PROGRAM
Payer: MEDICARE

## 2022-11-14 ENCOUNTER — APPOINTMENT (OUTPATIENT)
Dept: CT IMAGING | Age: 37
End: 2022-11-14
Attending: EMERGENCY MEDICINE
Payer: MEDICARE

## 2022-11-14 ENCOUNTER — APPOINTMENT (OUTPATIENT)
Dept: GENERAL RADIOLOGY | Age: 37
End: 2022-11-14
Attending: EMERGENCY MEDICINE
Payer: MEDICARE
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
## 2022-11-14 DIAGNOSIS — T40.601A OPIATE OVERDOSE, ACCIDENTAL OR UNINTENTIONAL, INITIAL ENCOUNTER (HCC): ICD-10-CM

## 2022-11-14 DIAGNOSIS — Z76.5 DRUG-SEEKING BEHAVIOR: ICD-10-CM

## 2022-11-14 DIAGNOSIS — M79.7 FIBROMYALGIA: ICD-10-CM

## 2022-11-14 DIAGNOSIS — F41.1 ANXIETY STATE: ICD-10-CM

## 2022-11-14 DIAGNOSIS — M79.601 RIGHT ARM PAIN: ICD-10-CM

## 2022-11-14 PROBLEM — R40.0 DROWSY: Status: ACTIVE | Noted: 2022-11-14

## 2022-11-14 LAB
GLUCOSE BLD STRIP.AUTO-MCNC: 152 MG/DL (ref 65–117)
GLUCOSE BLD STRIP.AUTO-MCNC: 154 MG/DL (ref 65–117)
GLUCOSE BLD STRIP.AUTO-MCNC: 168 MG/DL (ref 65–117)
GLUCOSE BLD STRIP.AUTO-MCNC: 220 MG/DL (ref 65–117)
GLUCOSE BLD STRIP.AUTO-MCNC: 300 MG/DL (ref 65–117)
GLUCOSE BLD STRIP.AUTO-MCNC: 45 MG/DL (ref 65–117)
GLUCOSE BLD STRIP.AUTO-MCNC: 47 MG/DL (ref 65–117)
GLUCOSE BLD STRIP.AUTO-MCNC: 87 MG/DL (ref 65–117)
SERVICE CMNT-IMP: ABNORMAL
SERVICE CMNT-IMP: NORMAL

## 2022-11-14 PROCEDURE — 73080 X-RAY EXAM OF ELBOW: CPT

## 2022-11-14 PROCEDURE — 99285 EMERGENCY DEPT VISIT HI MDM: CPT

## 2022-11-14 PROCEDURE — 74011250636 HC RX REV CODE- 250/636: Performed by: EMERGENCY MEDICINE

## 2022-11-14 PROCEDURE — 73610 X-RAY EXAM OF ANKLE: CPT

## 2022-11-14 PROCEDURE — G0378 HOSPITAL OBSERVATION PER HR: HCPCS

## 2022-11-14 PROCEDURE — 70486 CT MAXILLOFACIAL W/O DYE: CPT

## 2022-11-14 PROCEDURE — 74011250637 HC RX REV CODE- 250/637: Performed by: STUDENT IN AN ORGANIZED HEALTH CARE EDUCATION/TRAINING PROGRAM

## 2022-11-14 PROCEDURE — 74011250637 HC RX REV CODE- 250/637: Performed by: EMERGENCY MEDICINE

## 2022-11-14 PROCEDURE — 82962 GLUCOSE BLOOD TEST: CPT

## 2022-11-14 PROCEDURE — 74011636637 HC RX REV CODE- 636/637: Performed by: STUDENT IN AN ORGANIZED HEALTH CARE EDUCATION/TRAINING PROGRAM

## 2022-11-14 PROCEDURE — 96372 THER/PROPH/DIAG INJ SC/IM: CPT

## 2022-11-14 PROCEDURE — 74011250636 HC RX REV CODE- 250/636: Performed by: STUDENT IN AN ORGANIZED HEALTH CARE EDUCATION/TRAINING PROGRAM

## 2022-11-14 PROCEDURE — 74011000250 HC RX REV CODE- 250: Performed by: STUDENT IN AN ORGANIZED HEALTH CARE EDUCATION/TRAINING PROGRAM

## 2022-11-14 PROCEDURE — 73590 X-RAY EXAM OF LOWER LEG: CPT

## 2022-11-14 PROCEDURE — 73060 X-RAY EXAM OF HUMERUS: CPT

## 2022-11-14 PROCEDURE — 70450 CT HEAD/BRAIN W/O DYE: CPT

## 2022-11-14 PROCEDURE — 96374 THER/PROPH/DIAG INJ IV PUSH: CPT

## 2022-11-14 PROCEDURE — 74011000250 HC RX REV CODE- 250: Performed by: EMERGENCY MEDICINE

## 2022-11-14 RX ORDER — BUTALBITAL, ACETAMINOPHEN AND CAFFEINE 300; 40; 50 MG/1; MG/1; MG/1
1 CAPSULE ORAL
Qty: 20 CAPSULE | Refills: 0 | Status: SHIPPED | OUTPATIENT
Start: 2022-11-14 | End: 2022-11-14 | Stop reason: ALTCHOICE

## 2022-11-14 RX ORDER — ONDANSETRON 4 MG/1
4 TABLET, ORALLY DISINTEGRATING ORAL
Status: DISCONTINUED | OUTPATIENT
Start: 2022-11-14 | End: 2022-11-15 | Stop reason: HOSPADM

## 2022-11-14 RX ORDER — NALOXONE HYDROCHLORIDE 1 MG/ML
2 INJECTION INTRAMUSCULAR; INTRAVENOUS; SUBCUTANEOUS ONCE
Status: COMPLETED | OUTPATIENT
Start: 2022-11-14 | End: 2022-11-14

## 2022-11-14 RX ORDER — SODIUM CHLORIDE, SODIUM LACTATE, POTASSIUM CHLORIDE, CALCIUM CHLORIDE 600; 310; 30; 20 MG/100ML; MG/100ML; MG/100ML; MG/100ML
75 INJECTION, SOLUTION INTRAVENOUS CONTINUOUS
Status: DISCONTINUED | OUTPATIENT
Start: 2022-11-14 | End: 2022-11-15 | Stop reason: HOSPADM

## 2022-11-14 RX ORDER — SODIUM CHLORIDE 0.9 % (FLUSH) 0.9 %
5-40 SYRINGE (ML) INJECTION EVERY 8 HOURS
Status: DISCONTINUED | OUTPATIENT
Start: 2022-11-14 | End: 2022-11-15 | Stop reason: HOSPADM

## 2022-11-14 RX ORDER — PREGABALIN 150 MG/1
150 CAPSULE ORAL 3 TIMES DAILY
COMMUNITY

## 2022-11-14 RX ORDER — ONDANSETRON 2 MG/ML
4 INJECTION INTRAMUSCULAR; INTRAVENOUS
Status: DISCONTINUED | OUTPATIENT
Start: 2022-11-14 | End: 2022-11-15 | Stop reason: HOSPADM

## 2022-11-14 RX ORDER — MAGNESIUM SULFATE 100 %
4 CRYSTALS MISCELLANEOUS AS NEEDED
Status: DISCONTINUED | OUTPATIENT
Start: 2022-11-14 | End: 2022-11-15 | Stop reason: HOSPADM

## 2022-11-14 RX ORDER — SODIUM CHLORIDE 0.9 % (FLUSH) 0.9 %
5-40 SYRINGE (ML) INJECTION AS NEEDED
Status: DISCONTINUED | OUTPATIENT
Start: 2022-11-14 | End: 2022-11-15 | Stop reason: HOSPADM

## 2022-11-14 RX ORDER — POLYETHYLENE GLYCOL 3350 17 G/17G
17 POWDER, FOR SOLUTION ORAL DAILY PRN
Status: DISCONTINUED | OUTPATIENT
Start: 2022-11-14 | End: 2022-11-15 | Stop reason: HOSPADM

## 2022-11-14 RX ORDER — BUTALBITAL, ACETAMINOPHEN AND CAFFEINE 50; 325; 40 MG/1; MG/1; MG/1
1 TABLET ORAL
COMMUNITY

## 2022-11-14 RX ORDER — BACLOFEN 20 MG/1
20 TABLET ORAL
COMMUNITY

## 2022-11-14 RX ORDER — DIPHENHYDRAMINE HYDROCHLORIDE 50 MG/ML
25 INJECTION, SOLUTION INTRAMUSCULAR; INTRAVENOUS ONCE
Status: COMPLETED | OUTPATIENT
Start: 2022-11-14 | End: 2022-11-14

## 2022-11-14 RX ORDER — MORPHINE SULFATE 4 MG/ML
4 INJECTION INTRAVENOUS ONCE
Status: DISCONTINUED | OUTPATIENT
Start: 2022-11-14 | End: 2022-11-14

## 2022-11-14 RX ORDER — HALOPERIDOL 5 MG/ML
5 INJECTION INTRAMUSCULAR
Status: COMPLETED | OUTPATIENT
Start: 2022-11-14 | End: 2022-11-14

## 2022-11-14 RX ORDER — LORAZEPAM 2 MG/ML
2 INJECTION INTRAMUSCULAR ONCE
Status: COMPLETED | OUTPATIENT
Start: 2022-11-14 | End: 2022-11-14

## 2022-11-14 RX ORDER — AMMONIA 15 % (W/V)
1 AMPUL (EA) INHALATION
Status: DISCONTINUED | OUTPATIENT
Start: 2022-11-14 | End: 2022-11-14

## 2022-11-14 RX ORDER — NALOXONE HYDROCHLORIDE 4 MG/.1ML
SPRAY NASAL
Qty: 2 EACH | Refills: 0 | Status: SHIPPED | OUTPATIENT
Start: 2022-11-14

## 2022-11-14 RX ORDER — ACETAMINOPHEN 650 MG/1
650 SUPPOSITORY RECTAL
Status: DISCONTINUED | OUTPATIENT
Start: 2022-11-14 | End: 2022-11-15 | Stop reason: HOSPADM

## 2022-11-14 RX ORDER — NALOXONE HYDROCHLORIDE 1 MG/ML
1 INJECTION INTRAMUSCULAR; INTRAVENOUS; SUBCUTANEOUS
Status: ACTIVE | OUTPATIENT
Start: 2022-11-14 | End: 2022-11-15

## 2022-11-14 RX ORDER — BUTALBITAL, ACETAMINOPHEN AND CAFFEINE 50; 325; 40 MG/1; MG/1; MG/1
1 TABLET ORAL
Status: COMPLETED | OUTPATIENT
Start: 2022-11-14 | End: 2022-11-14

## 2022-11-14 RX ORDER — BUTALBITAL, ACETAMINOPHEN AND CAFFEINE 50; 325; 40 MG/1; MG/1; MG/1
1 TABLET ORAL
Qty: 20 TABLET | Refills: 0 | Status: SHIPPED | OUTPATIENT
Start: 2022-11-14

## 2022-11-14 RX ORDER — INSULIN LISPRO 100 [IU]/ML
1-10 INJECTION, SOLUTION INTRAVENOUS; SUBCUTANEOUS
Status: DISCONTINUED | OUTPATIENT
Start: 2022-11-14 | End: 2022-11-15 | Stop reason: HOSPADM

## 2022-11-14 RX ORDER — INSULIN GLARGINE 100 [IU]/ML
0.2 INJECTION, SOLUTION SUBCUTANEOUS DAILY
Status: DISCONTINUED | OUTPATIENT
Start: 2022-11-15 | End: 2022-11-14

## 2022-11-14 RX ORDER — ACETAMINOPHEN 325 MG/1
650 TABLET ORAL
Status: DISCONTINUED | OUTPATIENT
Start: 2022-11-14 | End: 2022-11-15 | Stop reason: HOSPADM

## 2022-11-14 RX ORDER — METOCLOPRAMIDE 10 MG/1
10 TABLET ORAL
Status: DISCONTINUED | OUTPATIENT
Start: 2022-11-14 | End: 2022-11-15 | Stop reason: HOSPADM

## 2022-11-14 RX ORDER — INSULIN GLARGINE 100 [IU]/ML
0.3 INJECTION, SOLUTION SUBCUTANEOUS
Status: DISCONTINUED | OUTPATIENT
Start: 2022-11-14 | End: 2022-11-15 | Stop reason: HOSPADM

## 2022-11-14 RX ORDER — DEXTROSE 50 % IN WATER (D50W) INTRAVENOUS SYRINGE
Status: DISPENSED
Start: 2022-11-14 | End: 2022-11-15

## 2022-11-14 RX ORDER — ENOXAPARIN SODIUM 100 MG/ML
40 INJECTION SUBCUTANEOUS DAILY
Status: DISCONTINUED | OUTPATIENT
Start: 2022-11-15 | End: 2022-11-15 | Stop reason: HOSPADM

## 2022-11-14 RX ORDER — DEXTROSE MONOHYDRATE 100 MG/ML
0-250 INJECTION, SOLUTION INTRAVENOUS AS NEEDED
Status: DISCONTINUED | OUTPATIENT
Start: 2022-11-14 | End: 2022-11-14 | Stop reason: SDUPTHER

## 2022-11-14 RX ORDER — DEXTROSE MONOHYDRATE 100 MG/ML
0-250 INJECTION, SOLUTION INTRAVENOUS AS NEEDED
Status: DISCONTINUED | OUTPATIENT
Start: 2022-11-14 | End: 2022-11-15 | Stop reason: HOSPADM

## 2022-11-14 RX ORDER — ONDANSETRON 4 MG/1
4 TABLET, ORALLY DISINTEGRATING ORAL
Status: COMPLETED | OUTPATIENT
Start: 2022-11-14 | End: 2022-11-14

## 2022-11-14 RX ADMIN — NALOXONE HYDROCHLORIDE 2 MG: 1 INJECTION PARENTERAL at 04:26

## 2022-11-14 RX ADMIN — NALOXONE HYDROCHLORIDE 2 MG: 1 INJECTION PARENTERAL at 04:22

## 2022-11-14 RX ADMIN — LORAZEPAM 2 MG: 2 INJECTION INTRAMUSCULAR at 04:46

## 2022-11-14 RX ADMIN — SODIUM CHLORIDE, POTASSIUM CHLORIDE, SODIUM LACTATE AND CALCIUM CHLORIDE 75 ML/HR: 600; 310; 30; 20 INJECTION, SOLUTION INTRAVENOUS at 14:25

## 2022-11-14 RX ADMIN — DEXTROSE MONOHYDRATE 250 ML: 100 INJECTION, SOLUTION INTRAVENOUS at 13:33

## 2022-11-14 RX ADMIN — SODIUM CHLORIDE, PRESERVATIVE FREE 10 ML: 5 INJECTION INTRAVENOUS at 21:56

## 2022-11-14 RX ADMIN — NALOXONE HYDROCHLORIDE 2 MG: 1 INJECTION PARENTERAL at 04:18

## 2022-11-14 RX ADMIN — ONDANSETRON 4 MG: 4 TABLET, ORALLY DISINTEGRATING ORAL at 03:16

## 2022-11-14 RX ADMIN — BUTALBITAL, ACETAMINOPHEN, AND CAFFEINE 1 TABLET: 50; 325; 40 TABLET ORAL at 03:16

## 2022-11-14 RX ADMIN — SODIUM CHLORIDE, PRESERVATIVE FREE 10 ML: 5 INJECTION INTRAVENOUS at 14:26

## 2022-11-14 RX ADMIN — Medication 7 UNITS: at 16:37

## 2022-11-14 RX ADMIN — Medication 21 UNITS: at 21:56

## 2022-11-14 RX ADMIN — HALOPERIDOL LACTATE 5 MG: 5 INJECTION, SOLUTION INTRAMUSCULAR at 05:36

## 2022-11-14 RX ADMIN — GLUCAGON 1 MG: KIT at 13:24

## 2022-11-14 RX ADMIN — DIPHENHYDRAMINE HYDROCHLORIDE 25 MG: 50 INJECTION, SOLUTION INTRAMUSCULAR; INTRAVENOUS at 04:47

## 2022-11-14 RX ADMIN — ACETAMINOPHEN 650 MG: 325 TABLET ORAL at 21:56

## 2022-11-14 NOTE — PROGRESS NOTES
Received patient from ED via stretcher. Patient noted to be drowsy, but arouses to touch. Patient is deaf. Patient had small formed BM upon admission to unit. Patient placed on continuous cardiac monitoring. 1550  Out of bed to bathroom with the assistance of one staff member. 1730  Patient asleep with even, unlabored respirations. [address] of bed to bathroom to void with the assistance of one staff member.

## 2022-11-14 NOTE — ED NOTES
Pt is more alert and aurosable. Pt arousable to touch. Pt is deaf. Pt states she is not having pain currently and is agreeable to admission.

## 2022-11-14 NOTE — ED NOTES
Pt was noted nodding out in room. This RN and another RN witnessed pt nodding out and having difficulty staying awake. This RN held morphine and updated MD.     Morphine returned to [facility].

## 2022-11-14 NOTE — ED NOTES
Pt admitted to taking Oxycodone from home. Pt says she doesn't know how much she took and doesn't know when she took it. Pt pupils are sluggish. Pt respirations are decreased, pt frequently falls asleep, Pt is yelling at staff.

## 2022-11-14 NOTE — H&P
Hospitalist Admission Note    NAME: Sav Ruggiero   :  1985   MRN:  [id number]   Room Number: ER06/06  @ [facility]     Date/Time:  2022 12:54 PM    Patient PCP: Nicole Barlow MD    Please note that this dictation was completed with [employer], the computer voice recognition software. Quite often unanticipated grammatical, syntax, homophones, and other interpretive errors are inadvertently transcribed by the computer software. Please disregard these errors. Please excuse any errors that have escaped final proofreading.  ______________________________________________________________________  Given the patient's current clinical presentation, I have a high level of concern for decompensation if discharged from the emergency department. Complex decision making was performed, which includes reviewing the patient's available past medical records, laboratory results, and x-ray films. My assessment of this patient's clinical condition and my plan of care is as follows. Assessment / Plan:  Anticipated discharge date : TBD  Anticipated disposition : SNF  Barriers to discharge : mental status        Active Problems:    Drowsy (2022)        #Acute metabolic encephalopathy POA  -CT head negative for acute process  -Suspected involuntary opioid use. Patient was given Narcan in the ER. Patient was agitated and was given Haldol Ativan Benadryl leading to drowsiness and sleepiness. Patient was admitted to medical floor given continued drowsiness. Later found to have blood glucose of 47 along with insulin pump.      -Remove insulin pump and treat hypoglycemia accordingly  -IV fluids  -Check UDS      #Type 1 diabetes mellitus  #Hypoglycemia POA resolving  -A1c pending  -Remove insulin pump  -Lantus   - Lispro correctional scale, FSG AC HS  - Consistent carb diet, hypoglycemia protocol.        #Multiple falls POA  #Left malleolar fracture POA  #Nondisplaced fracture of distal fibula right side POA  -Imaging has been reviewed patient has been seen by orthopedics recently and was treated on conservative lines per discussion between patient's family and orthopedics.      -Cam walking boot  -Crutches  -Nonweightbearing on left side  -Outpatient follow-up with orthopedics  -PT OT      Body mass index is 25.63 kg/m². Code Status: Full  Surrogate Decision Maker:    DVT Prophylaxis: Lovenox  GI Prophylaxis: not indicated        Subjective:   CHIEF COMPLAINT: Drowsiness    HISTORY OF PRESENT ILLNESS:     Tanvi Ch is a 42 y.o.  female with PMH of above-mentioned problems who presents to ED with c/o drowsiness. Initially in the ER patient was thought to be drowsy due to unwilling to treat ingestion of opioid medications which were given to her for ankle fracture. Patient was given Narcan in the ER but needing to agitation patient was given Haldol Benadryl and Ativan with further somnolence and drowsiness in the ER. Patient was found to be hypoglycemic in the ER. Hypoglycemia was treated further and admitted to medical floor for observation. Patient is deaf and mute sign language    We were asked to admit for work up and evaluation of the above problems. Past Medical History:   Diagnosis Date    Anxiety     Deaf     Fibromyalgia     Musculoskeletal disorder     back problems    Pleurisy         No past surgical history on file. Social History     Tobacco Use    Smoking status: Every Day    Smokeless tobacco: Not on file   Substance Use Topics    Alcohol use: No        No family history on file. Allergies   Allergen Reactions    Baclofen Nausea Only    Codeine Nausea and Vomiting    Erythromycin Nausea and Vomiting    Ibuprofen Nausea and Vomiting        Prior to Admission medications    Medication Sig Start Date End Date Taking? Authorizing Provider   naloxone (Narcan) 4 mg/actuation nasal spray Use 1 spray intranasally, then discard.  Repeat with new spray every 2 min as needed for opioid overdose symptoms, alternating nostrils. 11/14/22  Yes Zohra Wilson MD   butalbital-acetaminophen-caffeine (FIORICET, ESGIC) -40 mg per tablet Take 1 Tablet by mouth every six (6) hours as needed for Headache. 11/14/22  Yes Zohra Wilson MD   ibuprofen (MOTRIN) 600 mg tablet Take 1 Tablet by mouth every eight (8) hours as needed for Pain. 11/2/22   JAMIR Khanna Chi   dicyclomine (BENTYL) 20 mg tablet Take 1 Tablet by mouth every six (6) hours. 7/9/22   Vandana Miranda MD   ondansetron hcl (Zofran) 4 mg tablet Take 1 Tablet by mouth every eight (8) hours as needed for Nausea. 7/9/22   Vandana Miranda MD   insulin aspart U-100 (NOVOLOG) 100 unit/mL injection by SubCUTAneous route. Provider, Historical   BUSPIRONE HCL (BUSPAR PO) Take  by mouth. Other, MD Teo       REVIEW OF SYSTEMS:     I am not able to complete the review of systems because:    The patient is intubated and sedated   x The patient has altered mental status due to his acute medical problems    The patient has baseline aphasia from prior stroke(s)    The patient has baseline dementia and is not reliable historian    The patient is in acute medical distress and unable to provide information           Total of 12 systems reviewed as follows:       POSITIVE= underlined text  Negative = text not underlined  General:  fever, chills, sweats, generalized weakness, weight loss/gain,      loss of appetite   Eyes:    blurred vision, eye pain, loss of vision, double vision  ENT:    rhinorrhea, pharyngitis   Respiratory:   cough, sputum production, SOB, MONROY, wheezing, pleuritic pain   Cardiology:   chest pain, palpitations, orthopnea, PND, edema, syncope   Gastrointestinal:  abdominal pain , N/V, diarrhea, dysphagia, constipation, bleeding   Genitourinary:  frequency, urgency, dysuria, hematuria, incontinence   Muskuloskeletal :  arthralgia, myalgia, back pain  Hematology:  easy bruising, nose or gum bleeding, lymphadenopathy   Dermatological: rash, ulceration, pruritis, color change / jaundice  Endocrine:   hot flashes or polydipsia   Neurological:  headache, dizziness, confusion, focal weakness, paresthesia,     Speech difficulties, memory loss, gait difficulty  Psychological: Feelings of anxiety, depression, agitation    Objective:   VITALS:    Visit Vitals  BP (!) 125/92   Pulse 83   Temp 98 °F (36.7 °C)   Resp 18   Ht 5' 5\" (1.651 m)   Wt 69.9 kg (154 lb)   SpO2 99%   BMI 25.63 kg/m²       PHYSICAL EXAM:    General:    Drowsy  no distress, appears stated age. HEENT: Atraumatic, anicteric sclerae, pink conjunctivae, pin point pupil      No oral ulcers, mucosa moist, throat clear, dentition fair  Neck:  Supple, symmetrical,  thyroid: non tender  Lungs:   Clear to auscultation bilaterally. No Wheezing or Rhonchi. No rales. Chest wall:  No tenderness  No Accessory muscle use. Heart:   Regular  rhythm,  No  murmur   No edema  Abdomen:   Soft, non-tender. Not distended. Bowel sounds normal  Extremities: No cyanosis. No clubbing,      Skin turgor normal, Capillary refill normal, Radial dial pulse 2+  Skin:     Not pale.   Not Jaundiced  No rashes   Psych:  Unable to assess   Neurologic: Unable to assess     ______________________________________________________________________    Care Plan discussed with:  Patient/Family    Expected  Disposition:  SNF/LTC  ________________________________________________________________________  TOTAL TIME:  54 Minutes    Critical Care Provided     Minutes non procedure based      Comments    x Reviewed previous records   >50% of visit spent in counseling and coordination of care x Discussion with patient and/or family and questions answered       ________________________________________________________________________  Signed: Mickey Zambrano MD    Procedures: see electronic medical records for all procedures/Xrays and details which were not copied into this note but were reviewed prior to creation of Plan.     LAB DATA REVIEWED:    Recent Results (from the past 24 hour(s))   GLUCOSE, POC    Collection Time: 11/14/22  4:16 AM   Result Value Ref Range    Glucose (POC) 152 (H) 65 - 117 mg/dL    Performed by Bekah Kaye

## 2022-11-14 NOTE — ED NOTES
Bedside shift change report given to [id number]   (oncoming nurse) by Charles Hunt RN (offgoing nurse). Report included the following information SBAR and ED Summary .

## 2022-11-14 NOTE — ED NOTES
RN attempted to wake pt, pt not aurosable. RN sternal rubbed pt but pt continues to sleep. Pt RR are even and unlabored. Pt on monitor x 3.

## 2022-11-14 NOTE — ED NOTES
Pt presents to the ED via EMS d/t panic attack PTA. Pt reports she took multiple hits of her medical marijuana vape pen. Pt is A&Ox3. Pt confused of date. Pt is acting erratically and appears high. Pt keeps repeating that she is \"overwhelmed\" Pt is demanding zofran, fiorecet, and morphine from staff. Pt glucose is 216. Emergency Department Nursing Plan of Care       The Nursing Plan of Care is developed from the Nursing assessment and Emergency Department Attending provider initial evaluation. The plan of care may be reviewed in the ED Provider note.     The Plan of Care was developed with the following considerations:   Patient / Family readiness to learn indicated by:verbalized understanding  Persons(s) to be included in education: patient  Barriers to Learning/Limitations:No    Signed     Binta Khalil RN    11/14/2022   2:48 AM

## 2022-11-14 NOTE — ED NOTES
Pt keeps attempting to get out of bed. Pt told to stay in bed for safety. Pt reports she cannot breathe. Pt respirations are regular. Pt is 98% on RA. Pt is drowsy.

## 2022-11-14 NOTE — PROGRESS NOTES
Physical Therapy Note    PT orders received and acknowledged. Chart reviewed. Patient is in process of being transferred to hospital room. BS is very low and being treated. Chart reviewed. Patient appears to have recent L LE fx and will require WB orders. Will hold evaluation for medial stability.

## 2022-11-14 NOTE — ED NOTES
TRANSFER - OUT REPORT:    Verbal report given to [facility] RN(name) on Ruby Will  being transferred to PCU3(unit) for routine progression of care       Report consisted of patients Situation, Background, Assessment and   Recommendations(SBAR). Information from the following report(s) SBAR, Kardex, ED Summary, Procedure Summary, MAR, and Recent Results was reviewed with the receiving nurse. Lines:   Peripheral IV 11/14/22 Right Wrist (Active)   Site Assessment Clean, dry, & intact 11/14/22 1332   Phlebitis Assessment 0 11/14/22 1332   Infiltration Assessment 0 11/14/22 1332   Dressing Status Clean, dry, & intact 11/14/22 1332   Dressing Type Transparent 11/14/22 1332   Hub Color/Line Status Pink;Flushed 11/14/22 1332   Action Taken Blood drawn 11/14/22 1332        Opportunity for questions and clarification was provided.       Patient transported with:   Monitor  Registered Nurse

## 2022-11-14 NOTE — ED NOTES
Pt continues to be drowsy but is arousable to noxious stimuli. MD attempted to discuss admission w/ pt but pt refused to communicate w/ MD, nursing staff, or ALS interpretor.

## 2022-11-14 NOTE — ED PROVIDER NOTES
EMERGENCY DEPARTMENT HISTORY AND PHYSICAL EXAM               Please note that this dictation was completed with the assistance of \"Dragon\", the computer voice recognition software. Quite often unanticipated grammatical, syntax, homophones, and other interpretive errors are inadvertently transcribed by the computer software. Please disregard these errors and any errors that have escaped final proofreading. Thank you. Date: 11/14/22  Patient: Amy Ahn  Patient Age and Sex: 42 y.o. female   MRN: [id number]  CSN: [id number]    History of Presenting Illness     Chief Complaint   Patient presents with    Fall    Anxiety     History Provided By: Patient/family/EMS (if applicable)    HPI: Amy Ahn, 42 y.o. female with past medical history as documented below presents to the ED with c/o of fall occurring yesterday. Patient reports that she tripped over something and fell resulting in mild to moderate right arm pain. Patient also reports mild facial pain and headache. Patient does have history of fibromyalgia. She was seen recently for leg pain and does have a boot on. She states that she did smoke some weed this evening and that made her anxious. Pt denies any other exacerbating or ameliorating factors. Pt specifically denies any recent fever, chills, nausea, vomiting, abdominal pain, CP, SOB, lightheadedness, dizziness, numbness, weakness, lower extremity swelling, heart palpitations, urinary sxs, diarrhea, constipation, melena, hematochezia, cough, or congestion. There are no other complaints, changes or physical findings pertinent to the HPI at this time.     PCP: Darrell Madden MD  Past History   Past Medical History:  Past Medical History:   Diagnosis Date    Anxiety     Deaf     Fibromyalgia     Musculoskeletal disorder     back problems    Pleurisy        Past Surgical History:  Denies    Family History:   Family history reviewed and was non-contributory, unless specified below:    Social History:  Social History     Tobacco Use    Smoking status: Every Day   Substance Use Topics    Alcohol use: No    Drug use: Yes     Types: Marijuana       Allergies: Allergies   Allergen Reactions    Baclofen Nausea Only    Codeine Nausea and Vomiting    Erythromycin Nausea and Vomiting    Ibuprofen Nausea and Vomiting       Current Medications:  No current facility-administered medications on file prior to encounter. Current Outpatient Medications on File Prior to Encounter   Medication Sig Dispense Refill    insulin pump (PATIENT SUPPLIED) misc by SubCUTAneous route continuous. Omnipod  Uses Novolog insulin      blood-glucose sensor (DEXCOM G6 SENSOR) by Does Not Apply route continuous. baclofen (LIORESAL) 20 mg tablet Take 20 mg by mouth three (3) times daily as needed (muscle spasms). butalbital-acetaminophen-caffeine (FIORICET, ESGIC) -40 mg per tablet Take 1 Tablet by mouth two (2) times daily as needed for Migraine. pregabalin (Lyrica) 150 mg capsule Take 150 mg by mouth three (3) times daily. Indications: diabetic complication causing injury to some body nerves      ibuprofen (MOTRIN) 600 mg tablet Take 1 Tablet by mouth every eight (8) hours as needed for Pain. 20 Tablet 0    dicyclomine (BENTYL) 20 mg tablet Take 1 Tablet by mouth every six (6) hours. 20 Tablet 0    ondansetron hcl (Zofran) 4 mg tablet Take 1 Tablet by mouth every eight (8) hours as needed for Nausea. 20 Tablet 0    insulin aspart U-100 (NOVOLOG) 100 unit/mL injection by SubCUTAneous route. For use in Omnipod insulin pump  Indications: type 1 diabetes mellitus      BUSPIRONE HCL (BUSPAR PO) Take  by mouth. Review of Systems   A complete ROS was reviewed by me today and all other systems negative, unless otherwise specified below:  Review of Systems   Constitutional: Negative. Negative for chills and fever. HENT:  Positive for facial swelling. Negative for congestion and sore throat. Eyes: Negative. Respiratory: Negative. Negative for cough, chest tightness, shortness of breath and wheezing. Cardiovascular: Negative. Negative for chest pain, palpitations and leg swelling. Gastrointestinal: Negative. Negative for abdominal distention, abdominal pain, blood in stool, constipation, diarrhea, nausea and vomiting. Endocrine: Negative. Genitourinary: Negative. Negative for difficulty urinating, dysuria, flank pain, frequency, hematuria and urgency. Musculoskeletal:  Positive for arthralgias. Negative for back pain and neck stiffness. Skin: Negative. Negative for rash. Allergic/Immunologic: Negative. Neurological:  Positive for headaches. Negative for dizziness, syncope, weakness, light-headedness and numbness. Hematological: Negative. Psychiatric/Behavioral: Negative. Negative for confusion and self-injury. Physical Exam   Physical Exam  Vitals and nursing note reviewed. Constitutional:       General: She is not in acute distress. Appearance: She is well-developed. She is not diaphoretic. HENT:      Head: Normocephalic. Comments: No septal hematoma, midface stable, tenderness to palpation right cheek, no ecchymosis, no C-spine tenderness. Mouth/Throat:      Pharynx: No oropharyngeal exudate. Eyes:      Conjunctiva/sclera: Conjunctivae normal.      Pupils: Pupils are equal, round, and reactive to light. Cardiovascular:      Rate and Rhythm: Normal rate and regular rhythm. Heart sounds: Normal heart sounds. No murmur heard. No friction rub. No gallop. Pulmonary:      Effort: Pulmonary effort is normal. No respiratory distress. Breath sounds: Normal breath sounds. No wheezing or rales. Chest:      Chest wall: No tenderness. Abdominal:      General: Bowel sounds are normal. There is no distension. Palpations: Abdomen is soft. There is no mass. Tenderness: There is no abdominal tenderness. There is no guarding or rebound. Musculoskeletal:         General: Tenderness (Tender to palpation of the right humerus and right elbow, no obvious deformity, no shoulder tenderness or hand tenderness, neurovascular intact distally.) present. No deformity. Normal range of motion. Cervical back: Normal range of motion. Skin:     General: Skin is warm. Findings: No rash. Neurological:      Mental Status: She is alert and oriented to person, place, and time. Cranial Nerves: No cranial nerve deficit. Motor: No abnormal muscle tone. Coordination: Coordination normal.      Deep Tendon Reflexes: Reflexes normal.     Diagnostic Study Results     Laboratory Data  I have personally reviewed and interpreted all available laboratory results.    Recent Results (from the past 24 hour(s))   GLUCOSE, POC    Collection Time: 11/14/22  4:16 AM   Result Value Ref Range    Glucose (POC) 152 (H) 65 - 117 mg/dL    Performed by Elida West    GLUCOSE, POC    Collection Time: 11/14/22  1:18 PM   Result Value Ref Range    Glucose (POC) 47 (LL) 65 - 117 mg/dL    Performed by Cesia WILLETT    GLUCOSE, POC    Collection Time: 11/14/22  1:29 PM   Result Value Ref Range    Glucose (POC) 45 (LL) 65 - 117 mg/dL    Performed by Cristo Xiao RN    GLUCOSE, POC    Collection Time: 11/14/22  1:43 PM   Result Value Ref Range    Glucose (POC) 87 65 - 117 mg/dL    Performed by Cristo Xiao RN    GLUCOSE, POC    Collection Time: 11/14/22  1:54 PM   Result Value Ref Range    Glucose (POC) 154 (H) 65 - 117 mg/dL    Performed by Cesia WILLETT    GLUCOSE, POC    Collection Time: 11/14/22  2:29 PM   Result Value Ref Range    Glucose (POC) 220 (H) 65 - 117 mg/dL    Performed by Thierry Benson RN    GLUCOSE, POC    Collection Time: 11/14/22  4:07 PM   Result Value Ref Range    Glucose (POC) 300 (H) 65 - 117 mg/dL    Performed by Thierry Benson RN        Radiologic Studies   I have personally reviewed and interpreted all available imaging studies and agree with radiology interpretation. XR TIB/FIB LT   Final Result   Stable alignment lateral malleolar fracture. XR ANKLE LT MIN 3 V   Final Result   Stable alignment of the lateral malleolar fracture. Persistent   lateral soft tissue swelling. CT MAXILLOFACIAL WO CONT   Final Result   No acute fracture. CT HEAD WO CONT   Final Result   No acute fracture. XR HUMERUS RT   Final Result   No acute fracture. Technical factors regarding elbow radiographs as above. XR ELBOW RT MIN 3 V   Final Result   No acute fracture. Technical factors regarding elbow radiographs as above. CT Results  (Last 48 hours)                 11/14/22 0610  CT MAXILLOFACIAL WO CONT Final result    Impression:  No acute fracture. Narrative:  INDICATION: Trauma, fall. Exam: Noncontrast CT of the face is performed with 2.5 mm collimation. Coronal   and sagittal reformatted images were also obtained. CT dose reduction was achieved through the use of a standardized protocol   tailored for this examination and automatic exposure control for dose   modulation. FINDINGS: No acute fracture is visualized. Visualized articulations are normal.   No air-fluid level is seen within the paranasal sinuses. The globes are intact   bilaterally. Orbits are intact. Extraocular muscles and optic nerves are grossly   normal.           11/14/22 0609  CT HEAD WO CONT Final result    Impression:  No acute fracture. Narrative:  INDICATION: Fall, unknown LOC. Exam: Noncontrast CT of the face is performed with 2.5 mm collimation. Coronal   and sagittal reformatted images were also obtained. CT dose reduction was achieved through the use of a standardized protocol   tailored for this examination and automatic exposure control for dose   modulation. FINDINGS: No acute fracture is visualized.  Visualized articulations are normal.   No air-fluid level is seen within the paranasal sinuses. The globes are intact   bilaterally. Orbits are intact. Extraocular muscles and optic nerves are grossly   normal.                 CXR Results  (Last 48 hours)      None          Medical Decision Making   I am the first and primary ED physician for this patient's ED visit today. I reviewed our EMR for any past records that may contribute to the patient's current condition, including their past medical, surgical, social and family history. This also includes their most recent ED visits, previous hospitalizations and prior diagnostic data. I have reviewed and summarized the most pertinent findings in my HPI and MDM.     Vital Signs Reviewed:  Patient Vitals for the past 24 hrs:   Temp Pulse Resp BP SpO2   11/14/22 1924 98.4 °F (36.9 °C) 92 13 114/75 98 %   11/14/22 1811 98.1 °F (36.7 °C) 79 14 91/63 97 %   11/14/22 1803 -- 73 -- -- 99 %   11/14/22 1802 -- 77 14 -- 99 %   11/14/22 1801 -- 78 15 -- 99 %   11/14/22 1600 -- 99 -- -- --   11/14/22 1437 -- -- -- -- 100 %   11/14/22 1417 98 °F (36.7 °C) 99 12 (!) 151/111 100 %   11/14/22 1346 -- 84 10 -- 100 %   11/14/22 1345 -- 85 11 (!) 145/90 --   11/14/22 1200 -- 83 -- (!) 125/92 --   11/14/22 1141 -- 79 -- (!) 130/92 99 %   11/14/22 1126 -- 67 -- 121/86 100 %   11/14/22 1111 -- 67 -- 102/80 100 %   11/14/22 1056 -- 67 -- 101/75 100 %   11/14/22 1041 -- 66 -- 101/75 100 %   11/14/22 1026 -- 66 -- 99/78 100 %   11/14/22 1011 -- 67 -- 102/73 100 %   11/14/22 0956 -- 67 -- 101/76 100 %   11/14/22 0941 -- 67 -- 101/76 100 %   11/14/22 0926 -- 68 -- 103/77 100 %   11/14/22 0911 -- 68 -- 102/77 99 %   11/14/22 0856 -- 68 -- 110/84 99 %   11/14/22 0841 -- 69 -- 113/85 99 %   11/14/22 0826 -- 69 -- 115/83 99 %   11/14/22 0811 -- 70 -- 119/85 99 %   11/14/22 0630 -- 65 -- 92/66 98 %   11/14/22 0615 -- 67 -- 91/66 97 %   11/14/22 0448 -- 87 -- 136/88 97 %   11/14/22 0419 -- 97 -- (!) 132/95 100 %   11/14/22 0236 98 °F (36.7 °C) (!) 105 18 (!) 106/95 96 %     Pulse Oximetry Analysis: 96% on RA    Cardiac Monitor:   Rate: 87 bpm  The cardiac monitor revealed the following rhythm as interpreted by me: Normal Sinus Rhythm  Cardiac monitoring was ordered to monitor patient for signs of cardiac dysrhythmia, which they are at risk for based on their history and/or risk for cardiovascular disease and/or metabolic abnormalities. Records Reviewed: Nursing Notes, Old Medical Records, Previous electrocardiograms, Previous Radiology Studies and Previous Laboratory Studies, EMS reports    Provider Notes (Medical Decision Making):   Patient presents after fall with headache/facial and right arm pain. Will get imaging to further evaluate for fracture vs. Dislocation vs. Contusion. Will treat with analgesics at this time and continue to monitor for changes in mentation. I have discussed with the patient how to reduce likelihood of falling at home by removing throw rugs, loose wires or other objects from floor, installing hand-rails in bathrooms, tubs and halls, and leaving some lights on at night. ED Course:   Initial assessment performed. I discussed presenting problems and concerns, and my formulated plan for today's visit with the patient and any available family members. I have encouraged them to ask questions as they arise throughout the visit. Social History     Tobacco Use    Smoking status: Every Day   Substance Use Topics    Alcohol use: No    Drug use: Yes     Types: Marijuana     TOBACCO COUNSELING:  Upon evaluation, pt expressed that they are a current tobacco user. For approximately 3-5 mins, pt has been counseled on the dangers of smoking and was encouraged to quit as soon as possible in order to decrease further risks to their health. Pt has conveyed their understanding of the risks involved should they continue to use tobacco products.     ED Orders Placed:  Orders Placed This Encounter    MECHANICAL PROPHYLAXIS IS CONTRAINDICATED Other, please document Already on Anticoagulation    XR HUMERUS RT    XR ELBOW RT MIN 3 V    CT MAXILLOFACIAL WO CONT    CT HEAD WO CONT    XR TIB/FIB LT    XR ANKLE LT MIN 3 V    HEMOGLOBIN A1C    DRUG SCREEN, URINE    ADULT DIET Regular; 3 carb choices (45 gm/meal)    APPLY ICE TO SPECIFIED AREA    POC GLUCOSE    VITAL SIGNS    ACTIVITY AS TOLERATED W/ASSIST    NURSING-MISCELLANEOUS: Initiate hypoglycemic protocol if blood glucose is LESS THAN 70 mg/dL CONTINUOUS    NURSING-MISCELLANEOUS: FOR CONSCIOUS PATIENT: Administer 4 ounces fruit juice OR regular soda OR 4 glucose tablets. CONTINUOUS    NURSING-MISCELLANEOUS: If patient NPO, unconscious or unable to swallow and If Blood Glucose between 60 and 70 mg/dL: Follow instructions below If patient NPO, unconscious or unable to swallow and if blood glucose between 60 and 70 mg/dL: Give 25 . .. NURSING-MISCELLANEOUS: Repeat finger stick blood glucose in 15 minutes AFTER treatment, if LESS THAN 70 repeat hypoglycemic protocol and notify provider. CONTINUOUS    NURSING-MISCELLANEOUS: Following Hypoglycemic Protocol: Once patient is fully alert and BG greater than 70 provide a small snack,  if NPO consider IV fluids with dextrose. CONTINUOUS    NURSING-MISCELLANEOUS: Document all interventions in the Electronic Medical  Record (EMR).  CONTINUOUS    NURSING-MISCELLANEOUS: Blood glucose targets -- ICU: 140 - 180 mg/dL;  NON-ICU: 100 - 180 mg/dL CONTINUOUS    POC GLUCOSE - AC & HS    CARDIAC MONITORING    FULL CODE    IP CONSULT TO OCCUPATIONAL THERAPY    IP CONSULT TO PHYSICAL THERAPY    GLUCOSE, POC    GLUCOSE, POC    GLUCOSE, POC    GLUCOSE, POC    GLUCOSE, POC    GLUCOSE, POC    GLUCOSE, POC    DISCONTD: morphine injection 4 mg    ondansetron (ZOFRAN ODT) tablet 4 mg    butalbital-acetaminophen-caffeine (FIORICET, ESGIC) -40 mg per tablet 1 Tablet    naloxone (NARCAN) injection 2 mg    naloxone (NARCAN) injection 2 mg    DISCONTD: ammonia aromatic 15% (W/V) ampule for inhalation    naloxone (NARCAN) injection 1 mg    naloxone (NARCAN) injection 2 mg    LORazepam (ATIVAN) injection 2 mg    diphenhydrAMINE (BENADRYL) injection 25 mg    naloxone (Narcan) 4 mg/actuation nasal spray    DISCONTD: butalbital-acetaminophen-caff (Fioricet) -40 mg per capsule    haloperidol lactate (HALDOL) injection 5 mg    butalbital-acetaminophen-caffeine (FIORICET, ESGIC) -40 mg per tablet    sodium chloride (NS) flush 5-40 mL    sodium chloride (NS) flush 5-40 mL    OR Linked Order Group     acetaminophen (TYLENOL) tablet 650 mg     acetaminophen (TYLENOL) suppository 650 mg    polyethylene glycol (MIRALAX) packet 17 g    OR Linked Order Group     ondansetron (ZOFRAN ODT) tablet 4 mg     ondansetron (ZOFRAN) injection 4 mg    enoxaparin (LOVENOX) injection 40 mg    metoclopramide HCl (REGLAN) tablet 10 mg    glucose chewable tablet 16 g    glucagon (GLUCAGEN) injection 1 mg    DISCONTD: dextrose 10% infusion 0-250 mL    DISCONTD: insulin glargine (LANTUS) injection 14 Units    insulin lispro (HUMALOG) injection 1-10 Units    lactated Ringers infusion    dextrose 10% infusion 0-250 mL    dextrose (D50W) 50% injection syrg    insulin glargine (LANTUS) injection 21 Units    insulin pump (PATIENT SUPPLIED) misc    blood-glucose sensor (DEXCOM G6 SENSOR)    baclofen (LIORESAL) 20 mg tablet    butalbital-acetaminophen-caffeine (FIORICET, ESGIC) -40 mg per tablet    pregabalin (Lyrica) 150 mg capsule    INITIAL PHYSICIAN ORDER: OBSERVATION/OUTPATIENT IN A BED OBSERVATION; Stepdown; No; drowsy after taking pain meds    IP CONSULT TO BSMART       ED Medications Administered During ED Course:  Medications   naloxone (NARCAN) injection 1 mg (has no administration in time range)   sodium chloride (NS) flush 5-40 mL (10 mL IntraVENous Given 11/14/22 1926)   sodium chloride (NS) flush 5-40 mL (has no administration in time range)   acetaminophen (TYLENOL) tablet 650 mg (has no administration in time range)     Or   acetaminophen (TYLENOL) suppository 650 mg (has no administration in time range)   polyethylene glycol (MIRALAX) packet 17 g (has no administration in time range)   ondansetron (ZOFRAN ODT) tablet 4 mg (has no administration in time range)     Or   ondansetron (ZOFRAN) injection 4 mg (has no administration in time range)   enoxaparin (LOVENOX) injection 40 mg (has no administration in time range)   metoclopramide HCl (REGLAN) tablet 10 mg ( Oral Automatically Held 11/28/22 2200)   glucose chewable tablet 16 g (has no administration in time range)   glucagon (GLUCAGEN) injection 1 mg (1 mg IntraMUSCular Given 11/14/22 1324)   insulin lispro (HUMALOG) injection 1-10 Units (7 Units SubCUTAneous Given 11/14/22 1637)   lactated Ringers infusion (75 mL/hr IntraVENous New Bag 11/14/22 1425)   dextrose 10% infusion 0-250 mL (250 mL IntraVENous New Bag 11/14/22 1333)   dextrose (D50W) 50% injection syrg (has no administration in time range)   insulin glargine (LANTUS) injection 21 Units (has no administration in time range)   ondansetron (ZOFRAN ODT) tablet 4 mg (4 mg Oral Given 11/14/22 0316)   butalbital-acetaminophen-caffeine (FIORICET, ESGIC) -40 mg per tablet 1 Tablet (1 Tablet Oral Given 11/14/22 0316)   naloxone [facility]) injection 2 mg (2 mg IntraNASal Given 11/14/22 0418)   naloxone [facility]) injection 2 mg (2 mg IntraNASal Given 11/14/22 0422)   naloxone [facility]) injection 2 mg ( IntraNASal Canceled Entry 11/14/22 0710)   LORazepam (ATIVAN) injection 2 mg (2 mg IntraMUSCular Given 11/14/22 0446)   diphenhydrAMINE (BENADRYL) injection 25 mg (25 mg IntraMUSCular Given 11/14/22 0447)   haloperidol lactate (HALDOL) injection 5 mg (5 mg IntraMUSCular Given 11/14/22 0536)     ED Course as of 11/14/22 2138 Mon Nov 14, 2022   0330 Pt writes down on a paper towel that she would like \"Morphine, Zofran and Fioricet. \" Reviewed . Concern for drug seeking behavior.   [HW]   0400 Pt has been seen by Dr. Anne Alcantara on 11/3 for her bilateral fibula fx. [HW]   3302 Pt states she took 2 of her own oxycodone; pt noted to have pinpoint pupils and snoring respirations; plan for narcan.  [HW]   5430 Procedure Note - Opioid Reversal:   4:13 AM  Performed by Moni Diaz MD .  Opioid reversal was indicated for AMS and opioid overdose and performed 1 times. 2 mg of Narcan were given to pt. Patient awake and oriented x 3 at the end of the procedure. The procedure took 1-15 minutes, and pt tolerated well. [HW]   9902 Pt responded to Narcan; however, unable to sit still; agitated, pt given IM benadryl and IM ativan; CT attempted but unable to sit still; pt given IM haldol.  [HW]   0704 GLUCOSE,FAST - POC(!): 152 [HW]   1130 Received signout on this patient from Dr. Sarah Felix. Patient remains drowsy and minimally responsive. Given her frequent falls and instability as well as her drowsiness, will discuss with the hospitalist for admission for observation. [MS]   1147 12:38 PM  Snehal Moy MD spoke with Dr. Leila Pepper, Consult for Hospitalist. Discussed HPI and PE, available diagnostic tests and clinical findings. He is in agreement with care plans as outlined. He has reviewed the orthopedic notes and recommends repeating the left tib-fib films as well as a left ankle as there was some mention about the stability and the possible need for surgery. If the x-rays are unchanged, he agrees to admit for observation and PT/OT evaluation.  [MS]      ED Course User Index  [HW] Michael Ca MD  [MS] Yazan Stephenson MD     Amount and/or Complexity of Data Reviewed  Clinical lab tests: ordered as appropriate & reviewed  Tests in the radiology section of CPT®: ordered as appropriate & reviewed  Tests in the medicine section of CPT®: ordered as appropriate & reviewed  Obtain history from someone other than the patient: yes  Review and summarize past medical records: yes  Independent visualization of images, tracings, or specimens: yes     Progress Note:  I have just re-evaluated the patient. Patient reports improvement of symptoms. I have reviewed her vital signs and determined there is currently no worsening in their condition or physical exam. Results have been reviewed with them and their questions have been answered. I will continue to review further results as they come available. Progress Note:  I have reviewed and discussed the results of the prescription monitoring program with Niharika Ella. We have discussed patient's current pain, the use of narcotics for pain relief, and that in general narcotics are indicated for acute pain relief and not for chronic pain control. After a certain period of time narcotics should be stopped to avoid dependence. I warned the patient about the dangers of addiction and other side affects of narcotic abuse. We discussed how narcotics are controlled substances with a propensity for abuse/diversion and that the prescribing/usage of controlled substances is monitored very carefully by the [employer]. Pt understands that the most appropriate avenue for treatment of chronic pain is through a single (preferably pain management) physician and through a single pharmacy. Pt has been asked to seek further treatment for chronic pain through a pain management clinic and not through the emergency room. I spent 10 minutes on this discussion with the patient. Vernell Fernandez MD    Procedure Note - Opioid Reversal:   4:18 AM  Performed by Vernell Fernandez MD .  Opioid reversal was indicated for AMS and opioid overdose and performed 1 times. 2 mg of Narcan were given to pt. Patient awake and oriented x 3 at the end of the procedure. The procedure took 1-15 minutes, and pt tolerated well. Procedure Note - Opioid Reversal:   4:25 AM  Performed by Vernell Fernandez MD .  Opioid reversal was indicated for AMS and opioid overdose and performed 1 times. 2 mg of Narcan were given to pt.     Patient awake and oriented x 3 at the end of the procedure. The procedure took 1-15 minutes, and pt tolerated well. ED Course as of 11/14/22 2138   Mon Nov 14, 2022   0330 Pt writes down on a paper towel that she would like \"Morphine, Zofran and Fioricet. \" Reviewed . Concern for drug seeking behavior. [HW]   0400 Pt has been seen by Dr. Maninder Edwards on 11/3 for her bilateral fibula fx. [HW]   0759 Pt states she took 2 of her own oxycodone; pt noted to have pinpoint pupils and snoring respirations; plan for narcan.  [HW]   0413 Procedure Note - Opioid Reversal:   4:13 AM  Performed by Joslyn Rocha MD .  Opioid reversal was indicated for AMS and opioid overdose and performed 1 times. 2 mg of Narcan were given to pt. Patient awake and oriented x 3 at the end of the procedure. The procedure took 1-15 minutes, and pt tolerated well. [HW]   6040 Pt responded to Narcan; however, unable to sit still; agitated, pt given IM benadryl and IM ativan; CT attempted but unable to sit still; pt given IM haldol.  [HW]   0704 GLUCOSE,FAST - POC(!): 152 [HW]   1130 Received signout on this patient from Dr. Carol Porras. Patient remains drowsy and minimally responsive. Given her frequent falls and instability as well as her drowsiness, will discuss with the hospitalist for admission for observation. [MS]   1147 12:38 PM  Nas Davalos MD spoke with Dr. Mabel Cameron, Consult for Hospitalist. Discussed HPI and PE, available diagnostic tests and clinical findings. He is in agreement with care plans as outlined. He has reviewed the orthopedic notes and recommends repeating the left tib-fib films as well as a left ankle as there was some mention about the stability and the possible need for surgery. If the x-rays are unchanged, he agrees to admit for observation and PT/OT evaluation.  [MS]      ED Course User Index  [HW] Boo lOson MD  [MS] Pierre Maldonado MD       CRITICAL CARE NOTE :  IMPENDING DETERIORATION -Airway, Respiratory, Cardiovascular, and CNS  ASSOCIATED RISK FACTORS - Dysrhythmia, Metabolic changes, and CNS Decompensation  MANAGEMENT- Bedside Assessment and Supervision of Care  INTERPRETATION -  CT Scan, Blood Pressure, and Cardiac Output Measures   INTERVENTIONS - hemodynamic mngmt, Neurologic interventions , and Metobolic interventions  CASE REVIEW - Nursing and Family  TREATMENT RESPONSE -Improved  PERFORMED BY - Self    NOTES   :  I personally spent 45 minutes of critical care time with this patient. This is time spent at this critically ill patient's bedside actively involved in patient care as well as the coordination of care and discussions with the patient's family. This includes time involved in ordering and reviewing of laboratory studies, pulse oximetry, re-evaluation of patient's condition, examination of patient, evaluation of patient's response to treatment, ordering and performing treatments and interventions, review of old charts, consultations with specialist, discussions with family regarding pertinent collateral history and plan of care, bedside attention and documentation. During this entire length of time I was immediately available to the patient. This does not include time spent on separately reported billable procedures. Critical Care: The reason for providing this level of medical care for this critically-ill patient was due to a critical illness that impaired one or more vital organ systems, such that there was a high probability of imminent or life-threatening deterioration in the patient's condition. This care involved the highest level of preparedness to intervene urgently. This care involved high complexity decision making to assess, manipulate, and support vital system functions, to treat this degree of vital organ system failure, and to prevent further life threatening deterioration of the patients condition requiring frequent assessments and interventions.     Rosangela Briscoe, MD    Patient Reassessment:  Pt reassessed and symptoms noted to have improved significantly after ED treatment. Van Elias's labs and imaging have been reviewed with her and available family. She verbally conveys understanding and agreement of the signs, symptoms, diagnosis, treatment and prognosis and additionally agrees to follow up as recommended with Dr. Nikki Hook MD and/or specialist as instructed. She agrees with the care plan we have created and conveys that all of her questions have been answered. Additionally, I have put together a packet of discharge instructions for her that include: 1) Educational information regarding their diagnosis, 2) How to care for their diagnosis at home, as well a 3) List of reasons why they would want to return to the ED prior to their follow-up appointment should their condition change or symptoms worsen. Disposition:  DISCHARGE  The pt is ready for discharge. The pt's signs, symptoms, diagnosis, and discharge instructions have been discussed and pt has conveyed their understanding. The pt is to follow up as recommended or return to ER should their symptoms worsen. Plan has been discussed and pt is in agreement. I have answered all questions to the patient's satisfaction. Strict return precautions given. She and/or family conveyed understanding and agreement with care plan. Vital signs stable for discharge. Plan:  1. Return precautions as discussed with patient and available family/caregiver. 2.   Current Discharge Medication List        START taking these medications    Details   naloxone (Narcan) 4 mg/actuation nasal spray Use 1 spray intranasally, then discard. Repeat with new spray every 2 min as needed for opioid overdose symptoms, alternating nostrils.   Qty: 2 Each, Refills: 0  Start date: 11/14/2022      butalbital-acetaminophen-caffeine (FIORICET, ESGIC) -40 mg per tablet Take 1 Tablet by mouth every six (6) hours as needed for Headache. Qty: 20 Tablet, Refills: 0  Start date: 11/14/2022           3. Follow-up Information       Follow up With Specialties Details Why Contact Info    Parrish Arias MD Internal Medicine Physician, Hospitalist  As needed, If symptoms worsen [phone]      [facility] EMERGENCY DEPT Emergency Medicine  As needed, If symptoms worsen [address]    Parrish Arias MD Internal Medicine Physician, Hospitalist   [place] [address]  [phone]      Patrick Calderon MD Orthopedic Surgery  As needed, If symptoms worsen [facility]  [address]  [facility] [phone]            Instructed to return to ED if worse  Diagnosis   Clinical Impression:  1. Fall from ground level    2. Right arm pain    3. Anxiety state    4. Fibromyalgia    5. Opiate overdose, accidental or unintentional, initial encounter ([facility] [address]    6. Drug-seeking behavior      Attestation:  I, Bebo Kumar MD, am the attending of record for this patient. I personally performed the services described in this documentation on this date, 11/14/2022 for patient, Dinah Barillas. I have reviewed the chart and verified that the record is accurate and complete.

## 2022-11-14 NOTE — BSMART NOTE
BSMART was consulted for resources only. Spoke with RN, Ishan Brand, who put in consult stating patient was crying and RN felt patient needed someone to talk to. Writer discussed with RN, Ishan Brand who states patient is not at risk and is not in need of inpatient psych admission. RN, Ishan Brand agreed patient could be provided resources. At bed side attempting to arouse patient, but patient was sleeping. RN said patient had been given medication which made her tired due to her anxiety. RN was provided with resources. Consulted with oncoming ED provider, Dr. Vikki Murillo stating patient was not at risk which he received from pass- on from previous attending Dr. Brayden Lay; stating Dr. Vikki Murillo, said there was no [facility] consult needed for this patient due to no risk, but acknowledges patient is receiving resources. Suicide risk level noted to be unable to be assessed due to patient sleep from medication. Primary Nurse  and Physician Dr. Vikki Murillo notified. Concerns not observed. Security/Off- has not been notified.

## 2022-11-14 NOTE — ED NOTES
Pt's insulin pump found and removed from RLQ of abdmen. Pt's POC BG 47. Dr. Kevyn Lozada at bedside. PCT attempting to etablish IV. RN pulled IM glucagon and given in right vastus lateralis. 20G IV establish in right wrist. 250mL of D10 running to pt's IV over 15mins per PRN order.

## 2022-11-14 NOTE — ED TRIAGE NOTES
Pt presents to the ED after texting 911 \"I am having a panic attack\" per EMS. EMS reports that she admitted to smoking her marijuana vape pen and it made her anxious. Pt also reporting \"pain all over. \"    Pt was seen at [facility] 2 days PTA. Pt reports she had a heart attack. Pt denies admission, denies having surgery, denies taking blood thinners. Pt reports she fell \"15 times\" but unable to tell her RN when. Pt reports she broke her leg \"but I don't know when. \"     Pt story has changed multiple times. Pt appears to be high on drugs. Unable to get a concise story from this patient.  also reports issues with following the patients recollection of events. Pt is deaf, used tele ASL  with another RN for assessment.   RxEye #[id number]

## 2022-11-14 NOTE — ED NOTES
Called to xray by xray tech, advising pt is \"not waking up\". Tech reported pt has a pulse and is breathing but will not wake up to repeated attempts to arouse pt. Upon arrival to xray, pt snoring, pulse palpable, multiple sternal rubs applied with minimal movement, pt responded to nailbed pressure and was able to sit up with assistance of 3 staff members. Pt transferred to wheelchair and brought back to ED. ER MD made aware, primary nurse made aware. Pt more awake upon return to ED. Pt more awake and trying to get out of bed, pt moved to bed 6 for direct visual monitoring. Will continue to monitor.

## 2022-11-14 NOTE — DISCHARGE INSTRUCTIONS
Thank You! It was a pleasure taking care of you in our Emergency Department today. We know that when you come to [facility], you are entrusting us with your health, comfort, and safety. Our physicians and nurses honor that trust, and truly appreciate the opportunity to care for you and your loved ones. We also value your feedback. If you receive a survey about your Emergency Department experience today, please fill it out. We care about our patients' feedback, and we listen to what you have to say. Thank you. Dr. Joslyn Rocha M.D.      ____________________________________________________________________  I have included a copy of your lab results and/or radiologic studies from today's visit so you can have them easily available at your follow-up visit. We hope you feel better and please do not hesitate to contact the ED if you have any questions at all! Recent Results (from the past 12 hour(s))   GLUCOSE, POC    Collection Time: 11/14/22  4:16 AM   Result Value Ref Range    Glucose (POC) 152 (H) 65 - 117 mg/dL    Performed by Joel Harrell        CT MAXILLOFACIAL WO CONT   Final Result   No acute fracture. CT HEAD WO CONT   Final Result   No acute fracture. XR HUMERUS RT   Final Result   No acute fracture. Technical factors regarding elbow radiographs as above. XR ELBOW RT MIN 3 V   Final Result   No acute fracture. Technical factors regarding elbow radiographs as above. CT HEAD WO CONT    (Results Pending)     CT Results  (Last 48 hours)                 11/14/22 0610  CT MAXILLOFACIAL WO CONT Final result    Impression:  No acute fracture. Narrative:  INDICATION: Trauma, fall. Exam: Noncontrast CT of the face is performed with 2.5 mm collimation. Coronal   and sagittal reformatted images were also obtained.         CT dose reduction was achieved through the use of a standardized protocol   tailored for this examination and automatic exposure control for dose   modulation. FINDINGS: No acute fracture is visualized. Visualized articulations are normal.   No air-fluid level is seen within the paranasal sinuses. The globes are intact   bilaterally. Orbits are intact. Extraocular muscles and optic nerves are grossly   normal.           11/14/22 0609  CT HEAD WO CONT Final result    Impression:  No acute fracture. Narrative:  INDICATION: Fall, unknown LOC. Exam: Noncontrast CT of the face is performed with 2.5 mm collimation. Coronal   and sagittal reformatted images were also obtained. CT dose reduction was achieved through the use of a standardized protocol   tailored for this examination and automatic exposure control for dose   modulation. FINDINGS: No acute fracture is visualized. Visualized articulations are normal.   No air-fluid level is seen within the paranasal sinuses. The globes are intact   bilaterally. Orbits are intact. Extraocular muscles and optic nerves are grossly   normal.                 The exam and treatment you received in the Emergency Department were for an urgent problem and are not intended as complete care. It is important that you follow up with a doctor, nurse practitioner, or physician assistant for ongoing care. If your symptoms become worse or you do not improve as expected and you are unable to reach your usual health care provider, you should return to the Emergency Department. We are available 24 hours a day. Please take your discharge instructions with you when you go to your follow-up appointment. If a prescription has been provided, please have it filled as soon as possible to prevent a delay in treatment. Read the entire medication instruction sheet provided to you by the pharmacy.  If you have any questions or reservations about taking the medication due to side effects or interactions with other medications, please call your primary care physician or contact the ER to speak with the charge nurse. Please make an appointment with your family doctor or the physician you were referred to for follow-up of this visit as instructed on your discharge paperwork. Return to the ER if you are unable to be seen or if you are unable to be seen in a timely manner. If you have any problem arranging the follow-up visit, contact the Emergency Department immediately.         - Follow up PCP and Endocrinology   - Please insert new insulim pump pod as your last one was taken out in the ER

## 2022-11-14 NOTE — PROGRESS NOTES
Transition of Care Plan:  *RUR: TBD     * Disposition- Home  * Transportation at Discharge: Self  * IM/MOONS/OBS/FOC letter: OBS  * Appointment with PCP     CM opened case for assessment of D/C planning needs, CM reviewed chart CM attempt to assess patient, she is unable to participate at this time as patient is drowsy but arouses to touch. Patient is deaf  CM to follow-up for completed assessment and introduced self/role, verify demographics, and discussed discharge planning. Per last CM not on 6/22 [facility] [place] provided list of local Methadone clinics to ED provider to provide to pt as pt/family asking for pt to be started on Methadone\"    Will need to confirm patient dose.     [address] [facility], [address] [phone]

## 2022-11-14 NOTE — Clinical Note
Patient Class[de-identified] OBSERVATION [400]   Type of Bed: Medical [8]   Cardiac Monitoring Required?: No   Reason for Observation: drowsy after taking pain meds   Admitting Diagnosis: Krystyna Rivera [[id number]]   Admitting Physician: Philip Tenorio   Attending Physician: Betty Hong [[id number]]

## 2022-11-14 NOTE — ED NOTES
Pt would not communicate with , or staff when discussing possible admission.       ASL  utilized #[id number]

## 2022-11-14 NOTE — ED NOTES
TRANSFER - OUT REPORT:    Verbal report given to [employer] (name) on Azul Lacy  being transferred to Med/tele 210 (unit) for routine progression of care       Report consisted of patients Situation, Background, Assessment and   Recommendations(SBAR). Information from the following report(s) SBAR, Kardex, ED Summary, Procedure Summary, MAR and Recent Results was reviewed with the receiving nurse. Lines:       Opportunity for questions and clarification was provided.       Patient transported with:   Monitor  Registered Nurse

## 2022-11-14 NOTE — PROGRESS NOTES
Occupational Therapy: OT order received, chart reviewed. Patient notably drowsy and without alertness in ED, low BS discovered, and now transferring to a room. OT evaluation deferred at this time as patient is not appropriate    Noted PA documentation of 11/2/22   \"Plain films of the right ankle demonstrate a nondisplaced fracture of the distal fibula. Plain films of the left ankle demonstrate a mildly displaced fracture of the distal fibula extending to the level of the ankle mortise. These injuries are closed and she is neurovascularly intact. Will place the patient in a Cam walking boots and will offer crutches. She is instructed to not bear weight on the left ankle. \" Patient also instructed to follow up with ortho clinic    Will follow these activity restrictions unless updates are given during OT eval to ensure patient safety.    Britta Jaime OTR//MARGIE

## 2022-11-14 NOTE — ED NOTES
Assumed care of pt, pt back erick unsuccessful CT attempt x2. Per RN Anupam Izquierdo and tech, pt would flail and was unable to stay still long enough for head and facila bones ct. ER MD aware, order for IM haldol given. Upon return pt also had loose BM. Pericare and linen change performed, pt placed in dry brief. 3rd ct attempt successful. Pt brought back to ED and resting comfortably, pt on monitor, will continue to monitor.

## 2022-11-15 VITALS
TEMPERATURE: 98.3 F | HEART RATE: 130 BPM | WEIGHT: 154 LBS | OXYGEN SATURATION: 99 % | HEIGHT: 65 IN | RESPIRATION RATE: 22 BRPM | BODY MASS INDEX: 25.66 KG/M2 | SYSTOLIC BLOOD PRESSURE: 119 MMHG | DIASTOLIC BLOOD PRESSURE: 90 MMHG

## 2022-11-15 LAB
GLUCOSE BLD STRIP.AUTO-MCNC: 486 MG/DL (ref 65–117)
SERVICE CMNT-IMP: ABNORMAL

## 2022-11-15 PROCEDURE — 97535 SELF CARE MNGMENT TRAINING: CPT

## 2022-11-15 PROCEDURE — 74011636637 HC RX REV CODE- 636/637: Performed by: STUDENT IN AN ORGANIZED HEALTH CARE EDUCATION/TRAINING PROGRAM

## 2022-11-15 PROCEDURE — 82962 GLUCOSE BLOOD TEST: CPT

## 2022-11-15 PROCEDURE — 74011250637 HC RX REV CODE- 250/637: Performed by: STUDENT IN AN ORGANIZED HEALTH CARE EDUCATION/TRAINING PROGRAM

## 2022-11-15 PROCEDURE — 97161 PT EVAL LOW COMPLEX 20 MIN: CPT | Performed by: PHYSICAL THERAPIST

## 2022-11-15 PROCEDURE — G0378 HOSPITAL OBSERVATION PER HR: HCPCS

## 2022-11-15 PROCEDURE — 96372 THER/PROPH/DIAG INJ SC/IM: CPT

## 2022-11-15 PROCEDURE — 74011000250 HC RX REV CODE- 250: Performed by: STUDENT IN AN ORGANIZED HEALTH CARE EDUCATION/TRAINING PROGRAM

## 2022-11-15 PROCEDURE — 97165 OT EVAL LOW COMPLEX 30 MIN: CPT

## 2022-11-15 PROCEDURE — 74011250636 HC RX REV CODE- 250/636: Performed by: STUDENT IN AN ORGANIZED HEALTH CARE EDUCATION/TRAINING PROGRAM

## 2022-11-15 RX ORDER — BUTALBITAL, ACETAMINOPHEN AND CAFFEINE 50; 325; 40 MG/1; MG/1; MG/1
1 TABLET ORAL
Status: DISCONTINUED | OUTPATIENT
Start: 2022-11-15 | End: 2022-11-15 | Stop reason: HOSPADM

## 2022-11-15 RX ORDER — INSULIN LISPRO 100 [IU]/ML
8 INJECTION, SOLUTION INTRAVENOUS; SUBCUTANEOUS
Status: DISCONTINUED | OUTPATIENT
Start: 2022-11-15 | End: 2022-11-15 | Stop reason: HOSPADM

## 2022-11-15 RX ORDER — QUETIAPINE FUMARATE 25 MG/1
25 TABLET, FILM COATED ORAL ONCE
Status: COMPLETED | OUTPATIENT
Start: 2022-11-15 | End: 2022-11-15

## 2022-11-15 RX ADMIN — BUTALBITAL, ACETAMINOPHEN, AND CAFFEINE 1 TABLET: 50; 325; 40 TABLET ORAL at 08:44

## 2022-11-15 RX ADMIN — SODIUM CHLORIDE, PRESERVATIVE FREE 10 ML: 5 INJECTION INTRAVENOUS at 05:09

## 2022-11-15 RX ADMIN — ACETAMINOPHEN 650 MG: 325 TABLET ORAL at 05:09

## 2022-11-15 RX ADMIN — ENOXAPARIN SODIUM 40 MG: 100 INJECTION SUBCUTANEOUS at 08:44

## 2022-11-15 RX ADMIN — Medication 10 UNITS: at 08:43

## 2022-11-15 RX ADMIN — QUETIAPINE FUMARATE 25 MG: 25 TABLET ORAL at 03:33

## 2022-11-15 NOTE — PROGRESS NOTES
Care plan reviewed, patient progressing towards goals. Problem: Falls - Risk of  Goal: *Absence of Falls  Description: Document Colrain Embs Fall Risk and appropriate interventions in the flowsheet.   Outcome: Progressing Towards Goal  Note: Fall Risk Interventions:  Mobility Interventions: Bed/chair exit alarm    Mentation Interventions: Adequate sleep, hydration, pain control    Medication Interventions: Bed/chair exit alarm    Elimination Interventions: Call light in reach              Problem: Patient Education: Go to Patient Education Activity  Goal: Patient/Family Education  Outcome: Progressing Towards Goal

## 2022-11-15 NOTE — DISCHARGE SUMMARY
Hospitalist Discharge Summary     Patient ID:  Cookie Puente  [id number]  86 y.o.  1985    PCP on record: Jesus Byers MD    Admit date: 11/14/2022  Discharge date and time: 11/15/2022    Please note that this dictation was completed with [employer] the [employer] voice recognition software. Quite often unanticipated grammatical, syntax, homophones, and other interpretive errors are inadvertently transcribed by the computer software. Please disregard these errors. Please excuse any errors that have escaped final proofreading. Admission Diagnoses: Drowsy [R40.0]    Discharge Diagnoses: Active Problems:    Drowsy (11/14/2022)           Hospital Course:   #Acute metabolic encephalopathy d/t polypharmacy POA resolved   -CT head negative for acute process  -Suspected due to involuntary opioid use. Patient was given Narcan in the ER. Patient was agitated and was given Haldol Ativan Benadryl leading to drowsiness and sleepiness. Patient was admitted to medical floor given continued drowsiness. Later found to have blood glucose of 47 along with insulin pump.    -Patient was alert oriented and ambulatory on day of discharge. Patient requested to be discharged from the hospital.  Patient went down to the ER and was refusing to come up to medical floor. Patient states she wants to be discharged. Caregiver was with the patient and stated that patient requesting discharge. Discussed with caregiver that patient is being discharged to their care and to follow-up with primary care within management            #Type 1 diabetes mellitus  #Hypoglycemia POA resolved  -A1c 8.3  -Patient original insulin pump was removed in the ER. Per ER nursing they have disposed of given it was suspected one-time use type.   Patient caregiver stated that they would not require backup Lantus and lispro insulin as  they have insulin pump pods at home which they can use as backup.    -Per chart review from U patient was Using OmniPod Dash pods Generation 4. Per OmniPod website pod can last insulin for 3 days. Recommended to caregiver to insert another pod while patient gets home  -Recommended to caregiver to follow-up with primary care endocrine for management of type 1 diabetes   -Our team called endocrine office who stated they will send refills for OmniPod which will provide enough coverage till her next appointment     #Multiple falls POA  #Left malleolar fracture POA  #Nondisplaced fracture of distal fibula right side POA  -Imaging has been reviewed patient has been seen by orthopedics recently and was treated on conservative lines per discussion between patient's family and orthopedics.        -Cam walking boot  -Crutches  -Nonweightbearing on left side  -Outpatient follow-up with orthopedics  -Communicated with caregiver not to put weight on left and follow-up with orthopedics. They agree and verbalized understanding        CONSULTATIONS:  None    Excerpted HPI from H&P of Mickey Zambrano MD:  Jostin North is a 42 y.o.  female with PMH of above-mentioned problems who presents to ED with c/o drowsiness. Initially in the ER patient was thought to be drowsy due to unwilling to treat ingestion of opioid medications which were given to her for ankle fracture. Patient was given Narcan in the ER but needing to agitation patient was given Haldol Benadryl and Ativan with further somnolence and drowsiness in the ER. Patient was found to be hypoglycemic in the ER. Hypoglycemia was treated further and admitted to medical floor for observation. Patient is deaf and use  sign language       ______________________________________________________________________  DISCHARGE SUMMARY/HOSPITAL COURSE:  for full details see H&P, daily progress notes, labs, consult notes. _______________________________________________________________________  Patient seen and examined by me on discharge day.   Pertinent Findings:  Gen:    Not in distress  Chest: Clear lungs  CVS:   Regular rhythm. No edema  Abd:  Soft, not distended, not tender  Neuro:  Alert with good insight. Oriented to person, place, and time   _______________________________________________________________________  DISCHARGE MEDICATIONS:   Discharge Medication List as of 11/14/2022  7:33 AM        START taking these medications    Details   naloxone (Narcan) 4 mg/actuation nasal spray Use 1 spray intranasally, then discard. Repeat with new spray every 2 min as needed for opioid overdose symptoms, alternating nostrils. , Normal, Disp-2 Each, R-0      butalbital-acetaminophen-caffeine (FIORICET, ESGIC) -40 mg per tablet Take 1 Tablet by mouth every six (6) hours as needed for Headache., Normal, Disp-20 Tablet, R-0           CONTINUE these medications which have NOT CHANGED    Details   ibuprofen (MOTRIN) 600 mg tablet Take 1 Tablet by mouth every eight (8) hours as needed for Pain., Normal, Disp-20 Tablet, R-0      dicyclomine (BENTYL) 20 mg tablet Take 1 Tablet by mouth every six (6) hours. , Normal, Disp-20 Tablet, R-0      ondansetron hcl (Zofran) 4 mg tablet Take 1 Tablet by mouth every eight (8) hours as needed for Nausea., Normal, Disp-20 Tablet, R-0      insulin aspart U-100 (NOVOLOG) 100 unit/mL injection by SubCUTAneous route., Historical Med      BUSPIRONE HCL (BUSPAR PO) Take  by mouth. Historical Med             My Recommended Diet, Activity, Wound Care, and follow-up labs are listed in the patient's Discharge Insturctions which I have personally completed and reviewed.     _______________________________________________________________________  DISPOSITION:     Home with Family: x   Home with HH/PT/OT/RN:    SNF/LTC:    LUZ:    OTHER:        Condition at Discharge:  Stable  _______________________________________________________________________  Follow up with:   PCP : Parrish Arias MD  Follow-up Information       Follow up With Specialties Details Why Contact Info    Tom Kang MD Internal Medicine Physician, Hospitalist  As needed, If symptoms worsen [address] [facility] [phone]      [facility] EMERGENCY DEPT Emergency Medicine  As needed, If symptoms worsen [address] [facility]    Jones Monahan MD Orthopedic Surgery Follow up on 11/21/2022 Orthopedic-on 11/21/22 at 10:40am, Arrive 15 mins early, Bring discharge paperworks and insurance cards [address]  [address] [facility],[address]  [address] [phone]                  Total time in minutes spent coordinating this discharge (includes going over instructions, follow-up, prescriptions, and preparing report for sign off to her PCP) :  55 minutes    Signed:  [place] MD

## 2022-11-15 NOTE — PROGRESS NOTES
PHYSICAL THERAPY EVALUATION/DISCHARGE  Patient: Azul Lacy (72 y.o. female)  Date: 11/15/2022  Primary Diagnosis: Drowsy [R40.0]       Precautions:          ASSESSMENT  Based on the objective data described below, the patient presents with modified independence for mobility. Patient lives with her sister and uses a rollator walker. Patient has been seen by outpatient orthopedics and they recommend continuing to wear bilateral CAM boots. Patient independent with bed mobility and transfers. Patient ambulated 50 feet with rollator and modified independence. Patient with steady gait. Patient has no PT needs at discharge. Further skilled acute physical therapy is not indicated at this time. PLAN :  Recommendation for discharge: (in order for the patient to meet his/her long term goals)  No skilled physical therapy/ follow up rehabilitation needs identified at this time. This discharge recommendation:  Has been made in collaboration with the attending provider and/or case management    IF patient discharges home will need the following DME: patient owns a rollator walker       SUBJECTIVE:   Patient stated I feel good.     OBJECTIVE DATA SUMMARY:   HISTORY:    Past Medical History:   Diagnosis Date    Anxiety     Deaf     Fibromyalgia     Musculoskeletal disorder     back problems    Pleurisy    No past surgical history on file. Prior level of function: Lives with sister. Ambulates with rollator and B CAM boots    Home Situation  Home Environment: Private residence  # Steps to Enter: 0  One/Two Story Residence: One story  Living Alone: No  Support Systems: Other Family Member(s) (sister)  Patient Expects to be Discharged to[de-identified] Home  Current DME Used/Available at Home: Jose Dessert, rollator    EXAMINATION/PRESENTATION/DECISION MAKING:   Critical Behavior:   Alert and oriented x3           Hearing:   Auditory  Auditory Impairment: Deaf    Range Of Motion:  AROM: Within functional limits (Except B ankles)    PROM: Within functional limits (Except B ankles)    Strength:    Strength: Within functional limits (Except B ankles)       Tone & Sensation:   Tone: Normal    Coordination:  Coordination: Within functional limits    Functional Mobility:  Bed Mobility:     Supine to Sit: Independent  Sit to Supine: Independent  Scooting: Independent    Transfers:  Sit to Stand: Modified independent  Stand to Sit: Modified independent        Bed to Chair: Modified independent    Balance:   Sitting: Intact  Standing: Intact; With support    Ambulation/Gait Training:  Distance (ft): 50 Feet (ft)  Assistive Device: Walker, rollator  Ambulation - Level of Assistance: Modified independent  Speed/Hailee: Pace decreased (<100 feet/min)    Based on the above components, the patient evaluation is determined to be of the following complexity level: LOW     Pain Rating:  No pain    Activity Tolerance:   Good      After treatment patient left in no apparent distress:   Sitting in chair, Call bell within reach, and Caregiver / family present    COMMUNICATION/EDUCATION:   The patients plan of care was discussed with: Occupational therapist, Registered nurse, Physician, and Case management. Fall prevention education was provided and the patient/caregiver indicated understanding., Patient/family have participated as able in goal setting and plan of care. , and Patient/family agree to work toward stated goals and plan of care.     Thank you for this referral.  Amaya Ventura, PT   Time Calculation: 15 mins

## 2022-11-15 NOTE — PROGRESS NOTES
0715: Report from Rosmery Hines, [facility]. Orders, MAR, and results reviewed. 0815: Assisted pt to her wheelchair, refusing to use  line, communicating via pen and paper. Pt states she feels great, wants to go home. Assessment WNL, vitals show tachycardia, all other vitals WNL. Denies pain. 0830: Hyperglycemic, provider messaged, awaiting orders. 8474: Per Dr Russella Cockayne via perfect serve, ok to give 10 units Humalog. Will medicate for HA.    0930: Tele d/c. Labs ordered. 0945: Pt noted to be walking off the unit with her rollator and bilateral boots on. Caregiver following behind. Attempted to redirect pt to return to her room but she motioned that she wanted to smoke cigarettes and refused to return to the room. Followed pt to ED entrance where she refused to return to the unit. Pt agreed to wait while I retrieved supplies to remove IV. Pt caregiver requesting all of her belongings, stated they would not return to the room and that she would wait in the ED hallway and discharge there. 9614: Returned to unit to notify Dr Russella Cockayne of event. Dr Russella Cockayne and I presented to ED hallway to speak with pt and caregiver. Both individuals insistent on leaving. Per Dr Russella Cockayne, [facility] to d/c however pt was missing her belongings (insulin pump - which was removed and discarded in to sharps in the ED, and vape pen). Reached out to security regarding belongings. 5681: Security returned the pts cigarettes and lighter but did not have the vape pen or insulin pump. Of note, there was no security slip in the pts chart. There was also no pharmacy medication slip in the pts chart for the insulin pump. Pt arm band used to identify pt and pts belongings. Dr Russella Cockayne and myself explained that insulin pump was disposed of in ED. As a result of not returning the insulin pump and vape pen, pt and caregiver grew very aggravated, stating \"I am going to record this event and put it on youtube\" and \"you are criminals here\".  Dr Russella Cockayne assured that we could prescribe insulin for the pt and discharge her with a prescription. Pts caregiver refused. Again, Dr Marah Manning offered to call in a prescription for insulin to her pharmacy. Caregiver grew more irritated and began walking away, refusing any assistance. Pt and caregiver then departed ED hallway, aggravated and pt hollering and making motions to leave. 1000: Dr Marah Manning and myself returned to unit. RN supervisor - Karolina Herrera, and [address] made aware of events [facility] to escalate threats to risk mgmt. Refused to sign AVS, notated this on AVS signature sheet and placed in pts chart.

## 2022-11-18 ENCOUNTER — TELEPHONE (OUTPATIENT)
Dept: CASE MANAGEMENT | Age: 37
End: 2022-11-18

## 2022-11-18 NOTE — TELEPHONE ENCOUNTER
HAYLEE     CM Follow-up Call. Tried to reach patient with the contact # on the chart- for the sister- indicates not taking any calls     Patient has a Follow-up appointment with [facility] coming up. Talked to Chey Euceda at the [address] [phone]/ indicates did talked to brother- and no concerns identified. Prior to discharged some concerns regarding Pods contacted to her Insulin Controller. Was informed by Chey Euceda these pods are changed every 3 days- normally comes in pack of 10 per prescription. They can replaced the one that was disregarded on admit here. If any other issues with the supplies t related to this admit to notify me for follow-up.      CM to attempt second call     Cabrera HOWELL RN   Supervisor for [place]   [phone]

## 2025-05-12 NOTE — PROGRESS NOTES
1924 Change of shift report received from Germania Kitchen, [address] [facility], patient sitting quietly in be. Shift assessment completed, will continue to monitor. 2159 Tylenol given prn for complain of headache 4/10, will continue to monitor. 2229 Patient resting quietly in bed and does not appear to be in any distress at this time. 2300 Patient keeps disconnecting herself from the monitor and Spo2 monitor, this nurse has tried to redirect patient but she does not listen. Patient place back on the monitor, will check her again later. 0000 Patient has disconnected herself from the monitor is sitting in the chair sleeping and is refusing to get in the bed. This nurse place the small tele box on patient at this time because she is refusing to get in the bed.  0120 Patient finally got in the bed after some redirection, this nurse took the chair out of the room, will continue to monitor. 1811 Staff smelled cigarette smoke in the room and bathroom, patient had smoked in the bathroom. Nursing supervisor notified, called security, search patient's bag and found a purse full of cigarettes and a lighter among patient's belongings. Security took the cigarettes and lighter. Patient keeps disconnecting her IV fluids, tele monitor and Spo2, will notify the doctor. 0400 Order obtained for Seroquel 25 mg PO, given to patient, will continue to monitor. Patient is still awake, playing on her phone and has disconnected all her medical equipment, got the tele box on patient. 4507 Tylenol given for c/o headache.  4258 Patient sitting in bed, reconnected IV fluids for the 8th time this shift, will continue to monitor.   2415 Change of shift report given to Ely Capellan, Wanda, MAR. How Severe Are Your Spot(S)?: mild Have Your Spot(S) Been Treated In The Past?: has not been treated Hpi Title: Evaluation of Skin Lesions